# Patient Record
Sex: FEMALE | Race: WHITE | NOT HISPANIC OR LATINO
[De-identification: names, ages, dates, MRNs, and addresses within clinical notes are randomized per-mention and may not be internally consistent; named-entity substitution may affect disease eponyms.]

---

## 2017-03-21 ENCOUNTER — APPOINTMENT (OUTPATIENT)
Dept: HUMAN REPRODUCTION | Facility: CLINIC | Age: 42
End: 2017-03-21

## 2017-06-06 ENCOUNTER — APPOINTMENT (OUTPATIENT)
Dept: HUMAN REPRODUCTION | Facility: CLINIC | Age: 42
End: 2017-06-06

## 2017-06-13 ENCOUNTER — APPOINTMENT (OUTPATIENT)
Dept: HUMAN REPRODUCTION | Facility: CLINIC | Age: 42
End: 2017-06-13

## 2017-07-20 ENCOUNTER — RECORD ABSTRACTING (OUTPATIENT)
Age: 42
End: 2017-07-20

## 2017-07-20 DIAGNOSIS — Z83.3 FAMILY HISTORY OF DIABETES MELLITUS: ICD-10-CM

## 2017-07-20 DIAGNOSIS — Z31.69 ENCOUNTER FOR OTHER GENERAL COUNSELING AND ADVICE ON PROCREATION: ICD-10-CM

## 2017-07-20 DIAGNOSIS — Z82.49 FAMILY HISTORY OF ISCHEMIC HEART DISEASE AND OTHER DISEASES OF THE CIRCULATORY SYSTEM: ICD-10-CM

## 2017-07-20 DIAGNOSIS — Z87.59 PERSONAL HISTORY OF OTHER COMPLICATIONS OF PREGNANCY, CHILDBIRTH AND THE PUERPERIUM: ICD-10-CM

## 2017-07-25 ENCOUNTER — OTHER (OUTPATIENT)
Age: 42
End: 2017-07-25

## 2017-07-25 DIAGNOSIS — N97.9 FEMALE INFERTILITY, UNSPECIFIED: ICD-10-CM

## 2017-08-07 ENCOUNTER — OTHER (OUTPATIENT)
Age: 42
End: 2017-08-07

## 2017-08-07 ENCOUNTER — APPOINTMENT (OUTPATIENT)
Dept: HUMAN REPRODUCTION | Facility: CLINIC | Age: 42
End: 2017-08-07

## 2017-08-07 RX ORDER — ESTRADIOL 2 MG/1
2 TABLET ORAL
Qty: 90 | Refills: 2 | Status: ACTIVE | COMMUNITY
Start: 2017-08-07 | End: 1900-01-01

## 2017-08-10 ENCOUNTER — APPOINTMENT (OUTPATIENT)
Dept: MATERNAL FETAL MEDICINE | Facility: CLINIC | Age: 42
End: 2017-08-10
Payer: COMMERCIAL

## 2017-08-10 ENCOUNTER — ASOB RESULT (OUTPATIENT)
Age: 42
End: 2017-08-10

## 2017-08-10 ENCOUNTER — APPOINTMENT (OUTPATIENT)
Dept: ANTEPARTUM | Facility: CLINIC | Age: 42
End: 2017-08-10

## 2017-08-10 VITALS
BODY MASS INDEX: 31.58 KG/M2 | SYSTOLIC BLOOD PRESSURE: 118 MMHG | WEIGHT: 185 LBS | DIASTOLIC BLOOD PRESSURE: 82 MMHG | HEIGHT: 64 IN

## 2017-08-10 LAB
BILIRUB UR QL STRIP: NORMAL
GLUCOSE UR-MCNC: NORMAL
HCG UR QL: 0.2 EU/DL
HGB UR QL STRIP.AUTO: NORMAL
KETONES UR-MCNC: NORMAL
LEUKOCYTE ESTERASE UR QL STRIP: NORMAL
NITRITE UR QL STRIP: NORMAL
PH UR STRIP: 5
PROT UR STRIP-MCNC: NORMAL
SP GR UR STRIP: 1.02

## 2017-08-10 PROCEDURE — 99243 OFF/OP CNSLTJ NEW/EST LOW 30: CPT | Mod: 25

## 2017-08-11 LAB
ALBUMIN SERPL ELPH-MCNC: 4 G/DL
ALP BLD-CCNC: 65 U/L
ALT SERPL-CCNC: 20 U/L
ANION GAP SERPL CALC-SCNC: 16 MMOL/L
AST SERPL-CCNC: 21 U/L
BASOPHILS # BLD AUTO: 0.03 K/UL
BASOPHILS NFR BLD AUTO: 0.4 %
BILIRUB SERPL-MCNC: <0.2 MG/DL
BUN SERPL-MCNC: 13 MG/DL
CALCIUM SERPL-MCNC: 10.1 MG/DL
CHLORIDE SERPL-SCNC: 103 MMOL/L
CO2 SERPL-SCNC: 22 MMOL/L
CREAT SERPL-MCNC: 0.85 MG/DL
EOSINOPHIL # BLD AUTO: 0.29 K/UL
EOSINOPHIL NFR BLD AUTO: 3.8 %
GLUCOSE SERPL-MCNC: 96 MG/DL
HCT VFR BLD CALC: 41.1 %
HGB BLD-MCNC: 13 G/DL
IMM GRANULOCYTES NFR BLD AUTO: 0.1 %
LYMPHOCYTES # BLD AUTO: 1.91 K/UL
LYMPHOCYTES NFR BLD AUTO: 25.3 %
MAN DIFF?: NORMAL
MCHC RBC-ENTMCNC: 29.1 PG
MCHC RBC-ENTMCNC: 31.6 GM/DL
MCV RBC AUTO: 91.9 FL
MONOCYTES # BLD AUTO: 0.22 K/UL
MONOCYTES NFR BLD AUTO: 2.9 %
NEUTROPHILS # BLD AUTO: 5.1 K/UL
NEUTROPHILS NFR BLD AUTO: 67.5 %
PLATELET # BLD AUTO: 298 K/UL
POTASSIUM SERPL-SCNC: 4.9 MMOL/L
PROT SERPL-MCNC: 7 G/DL
RBC # BLD: 4.47 M/UL
RBC # FLD: 14.1 %
SODIUM SERPL-SCNC: 141 MMOL/L
WBC # FLD AUTO: 7.56 K/UL

## 2017-08-14 ENCOUNTER — APPOINTMENT (OUTPATIENT)
Dept: HUMAN REPRODUCTION | Facility: CLINIC | Age: 42
End: 2017-08-14
Payer: COMMERCIAL

## 2017-08-14 PROCEDURE — 76830 TRANSVAGINAL US NON-OB: CPT

## 2017-08-14 PROCEDURE — 36415 COLL VENOUS BLD VENIPUNCTURE: CPT

## 2017-08-14 PROCEDURE — 99213 OFFICE O/P EST LOW 20 MIN: CPT | Mod: 25

## 2017-08-21 ENCOUNTER — APPOINTMENT (OUTPATIENT)
Dept: HUMAN REPRODUCTION | Facility: CLINIC | Age: 42
End: 2017-08-21
Payer: COMMERCIAL

## 2017-08-21 ENCOUNTER — OTHER (OUTPATIENT)
Age: 42
End: 2017-08-21

## 2017-08-21 PROCEDURE — 99213 OFFICE O/P EST LOW 20 MIN: CPT | Mod: 25

## 2017-08-21 PROCEDURE — 76830 TRANSVAGINAL US NON-OB: CPT

## 2017-08-21 PROCEDURE — 36415 COLL VENOUS BLD VENIPUNCTURE: CPT

## 2017-08-22 ENCOUNTER — OTHER (OUTPATIENT)
Age: 42
End: 2017-08-22

## 2017-08-22 RX ORDER — MEDROXYPROGESTERONE ACETATE 10 MG/1
10 TABLET ORAL
Qty: 7 | Refills: 0 | Status: ACTIVE | COMMUNITY
Start: 2017-08-21 | End: 1900-01-01

## 2017-08-28 ENCOUNTER — OUTPATIENT (OUTPATIENT)
Dept: OUTPATIENT SERVICES | Facility: HOSPITAL | Age: 42
LOS: 1 days | End: 2017-08-28
Payer: COMMERCIAL

## 2017-08-28 DIAGNOSIS — Z31.69 ENCOUNTER FOR OTHER GENERAL COUNSELING AND ADVICE ON PROCREATION: ICD-10-CM

## 2017-08-28 PROCEDURE — 93010 ELECTROCARDIOGRAM REPORT: CPT

## 2017-08-28 PROCEDURE — 93005 ELECTROCARDIOGRAM TRACING: CPT

## 2017-09-06 ENCOUNTER — APPOINTMENT (OUTPATIENT)
Dept: HUMAN REPRODUCTION | Facility: CLINIC | Age: 42
End: 2017-09-06
Payer: COMMERCIAL

## 2017-09-06 PROCEDURE — 58999 UNLISTED PX FML GENITAL SYS: CPT

## 2017-09-06 PROCEDURE — 81025 URINE PREGNANCY TEST: CPT

## 2017-09-06 PROCEDURE — 76831 ECHO EXAM UTERUS: CPT

## 2017-09-06 PROCEDURE — 58340 CATHETER FOR HYSTEROGRAPHY: CPT

## 2017-09-06 PROCEDURE — 99213 OFFICE O/P EST LOW 20 MIN: CPT | Mod: 25

## 2017-09-27 ENCOUNTER — OTHER (OUTPATIENT)
Age: 42
End: 2017-09-27

## 2017-09-29 ENCOUNTER — APPOINTMENT (OUTPATIENT)
Dept: HUMAN REPRODUCTION | Facility: CLINIC | Age: 42
End: 2017-09-29
Payer: COMMERCIAL

## 2017-09-29 PROCEDURE — 76830 TRANSVAGINAL US NON-OB: CPT

## 2017-09-29 PROCEDURE — 36415 COLL VENOUS BLD VENIPUNCTURE: CPT

## 2017-09-29 PROCEDURE — 99213 OFFICE O/P EST LOW 20 MIN: CPT | Mod: 25

## 2017-10-09 ENCOUNTER — APPOINTMENT (OUTPATIENT)
Dept: HUMAN REPRODUCTION | Facility: CLINIC | Age: 42
End: 2017-10-09
Payer: COMMERCIAL

## 2017-10-09 PROCEDURE — 36415 COLL VENOUS BLD VENIPUNCTURE: CPT

## 2017-10-09 PROCEDURE — 76830 TRANSVAGINAL US NON-OB: CPT

## 2017-10-09 PROCEDURE — 99213 OFFICE O/P EST LOW 20 MIN: CPT | Mod: 25

## 2017-10-19 ENCOUNTER — APPOINTMENT (OUTPATIENT)
Dept: HUMAN REPRODUCTION | Facility: CLINIC | Age: 42
End: 2017-10-19

## 2017-10-19 ENCOUNTER — OTHER (OUTPATIENT)
Age: 42
End: 2017-10-19

## 2017-10-20 ENCOUNTER — APPOINTMENT (OUTPATIENT)
Dept: HUMAN REPRODUCTION | Facility: CLINIC | Age: 42
End: 2017-10-20
Payer: COMMERCIAL

## 2017-10-20 PROCEDURE — 76830 TRANSVAGINAL US NON-OB: CPT

## 2017-10-20 PROCEDURE — 36415 COLL VENOUS BLD VENIPUNCTURE: CPT

## 2017-10-20 PROCEDURE — 99213 OFFICE O/P EST LOW 20 MIN: CPT | Mod: 25

## 2017-10-27 ENCOUNTER — APPOINTMENT (OUTPATIENT)
Dept: HUMAN REPRODUCTION | Facility: CLINIC | Age: 42
End: 2017-10-27
Payer: COMMERCIAL

## 2017-10-27 PROCEDURE — 99213 OFFICE O/P EST LOW 20 MIN: CPT | Mod: 25

## 2017-10-27 PROCEDURE — 36415 COLL VENOUS BLD VENIPUNCTURE: CPT

## 2017-10-27 PROCEDURE — 76830 TRANSVAGINAL US NON-OB: CPT

## 2017-11-02 ENCOUNTER — OTHER (OUTPATIENT)
Age: 42
End: 2017-11-02

## 2017-11-02 RX ORDER — SYRINGE WITH NEEDLE, 1 ML 25GX5/8"
22G X 1-1/2" SYRINGE, EMPTY DISPOSABLE MISCELLANEOUS
Qty: 30 | Refills: 1 | Status: ACTIVE | COMMUNITY
Start: 2017-11-02 | End: 1900-01-01

## 2017-11-02 RX ORDER — ISOPROPYL ALCOHOL 0.7 ML/ML
SWAB TOPICAL
Qty: 30 | Refills: 3 | Status: ACTIVE | COMMUNITY
Start: 2017-11-02 | End: 1900-01-01

## 2017-11-02 RX ORDER — NEEDLES, SAFETY 18GX1 1/2"
18G X 1-1/2" NEEDLE, DISPOSABLE MISCELLANEOUS
Qty: 30 | Refills: 1 | Status: ACTIVE | COMMUNITY
Start: 2017-11-02 | End: 1900-01-01

## 2017-11-02 RX ORDER — PROGESTERONE 50 MG/ML
50 INJECTION, SOLUTION INTRAMUSCULAR
Qty: 3 | Refills: 3 | Status: ACTIVE | COMMUNITY
Start: 2017-11-02 | End: 1900-01-01

## 2017-11-03 ENCOUNTER — OTHER (OUTPATIENT)
Age: 42
End: 2017-11-03

## 2017-11-03 RX ORDER — MEDROXYPROGESTERONE ACETATE 10 MG/1
10 TABLET ORAL DAILY
Qty: 7 | Refills: 0 | Status: ACTIVE | COMMUNITY
Start: 2017-11-03 | End: 1900-01-01

## 2018-07-06 ENCOUNTER — OTHER (OUTPATIENT)
Age: 43
End: 2018-07-06

## 2018-07-06 RX ORDER — ESTRADIOL 2 MG/1
2 TABLET ORAL
Qty: 90 | Refills: 2 | Status: ACTIVE | COMMUNITY
Start: 2017-07-25 | End: 1900-01-01

## 2018-07-06 RX ORDER — DESOGESTREL AND ETHINYL ESTRADIOL 0.15-0.03
0.15-3 KIT ORAL
Qty: 28 | Refills: 0 | Status: ACTIVE | COMMUNITY
Start: 2017-09-27 | End: 1900-01-01

## 2018-07-11 ENCOUNTER — APPOINTMENT (OUTPATIENT)
Dept: HUMAN REPRODUCTION | Facility: CLINIC | Age: 43
End: 2018-07-11

## 2018-07-12 ENCOUNTER — APPOINTMENT (OUTPATIENT)
Dept: HUMAN REPRODUCTION | Facility: CLINIC | Age: 43
End: 2018-07-12
Payer: COMMERCIAL

## 2018-07-12 ENCOUNTER — APPOINTMENT (OUTPATIENT)
Dept: HUMAN REPRODUCTION | Facility: CLINIC | Age: 43
End: 2018-07-12

## 2018-07-12 ENCOUNTER — OTHER (OUTPATIENT)
Age: 43
End: 2018-07-12

## 2018-07-12 PROCEDURE — 76830 TRANSVAGINAL US NON-OB: CPT

## 2018-07-12 PROCEDURE — 58999 UNLISTED PX FML GENITAL SYS: CPT

## 2018-07-12 PROCEDURE — 99213 OFFICE O/P EST LOW 20 MIN: CPT | Mod: 25

## 2018-07-12 PROCEDURE — 58340 CATHETER FOR HYSTEROGRAPHY: CPT

## 2018-07-13 ENCOUNTER — OTHER (OUTPATIENT)
Age: 43
End: 2018-07-13

## 2018-07-13 RX ORDER — LEVOTHYROXINE SODIUM 0.03 MG/1
25 TABLET ORAL DAILY
Qty: 30 | Refills: 1 | Status: ACTIVE | COMMUNITY
Start: 2018-07-13 | End: 1900-01-01

## 2018-07-16 ENCOUNTER — APPOINTMENT (OUTPATIENT)
Dept: HUMAN REPRODUCTION | Facility: CLINIC | Age: 43
End: 2018-07-16
Payer: COMMERCIAL

## 2018-07-16 PROCEDURE — 99213 OFFICE O/P EST LOW 20 MIN: CPT | Mod: 25

## 2018-07-16 PROCEDURE — 76830 TRANSVAGINAL US NON-OB: CPT

## 2018-07-16 PROCEDURE — 36415 COLL VENOUS BLD VENIPUNCTURE: CPT

## 2018-07-31 ENCOUNTER — APPOINTMENT (OUTPATIENT)
Dept: HUMAN REPRODUCTION | Facility: CLINIC | Age: 43
End: 2018-07-31
Payer: COMMERCIAL

## 2018-07-31 PROCEDURE — 99213 OFFICE O/P EST LOW 20 MIN: CPT | Mod: 25

## 2018-07-31 PROCEDURE — 76830 TRANSVAGINAL US NON-OB: CPT

## 2018-07-31 PROCEDURE — 36415 COLL VENOUS BLD VENIPUNCTURE: CPT

## 2018-08-02 ENCOUNTER — OTHER (OUTPATIENT)
Age: 43
End: 2018-08-02

## 2018-08-03 RX ORDER — METHYLPREDNISOLONE 8 MG/1
8 TABLET ORAL
Qty: 8 | Refills: 0 | Status: ACTIVE | COMMUNITY
Start: 2017-11-02 | End: 1900-01-01

## 2018-08-03 RX ORDER — DOXYCYCLINE HYCLATE 100 MG/1
100 CAPSULE ORAL TWICE DAILY
Qty: 8 | Refills: 0 | Status: ACTIVE | COMMUNITY
Start: 2017-11-02 | End: 1900-01-01

## 2018-08-06 ENCOUNTER — APPOINTMENT (OUTPATIENT)
Dept: HUMAN REPRODUCTION | Facility: CLINIC | Age: 43
End: 2018-08-06
Payer: COMMERCIAL

## 2018-08-06 PROCEDURE — 76830 TRANSVAGINAL US NON-OB: CPT

## 2018-08-06 PROCEDURE — 99213 OFFICE O/P EST LOW 20 MIN: CPT | Mod: 25

## 2018-08-06 PROCEDURE — 36415 COLL VENOUS BLD VENIPUNCTURE: CPT

## 2018-08-08 ENCOUNTER — OTHER (OUTPATIENT)
Age: 43
End: 2018-08-08

## 2018-08-08 PROCEDURE — DP002: CPT

## 2018-08-08 PROCEDURE — 89250 CULTR OOCYTE/EMBRYO <4 DAYS: CPT

## 2018-08-08 PROCEDURE — 89281 ASSIST OOCYTE FERTILIZATION: CPT

## 2018-08-08 PROCEDURE — DP001: CPT

## 2018-08-08 PROCEDURE — 89254 OOCYTE IDENTIFICATION: CPT

## 2018-08-09 RX ORDER — MEDROXYPROGESTERONE ACETATE 10 MG/1
10 TABLET ORAL
Qty: 5 | Refills: 0 | Status: ACTIVE | COMMUNITY
Start: 2018-08-08 | End: 1900-01-01

## 2018-08-11 PROCEDURE — 89253 EMBRYO HATCHING: CPT

## 2018-08-12 PROCEDURE — 89272 EXTENDED CULTURE OF OOCYTES: CPT

## 2018-08-13 PROCEDURE — 89258 CRYOPRESERVATION EMBRYO(S): CPT

## 2018-08-14 PROCEDURE — 89342 STORAGE/YEAR EMBRYO(S): CPT

## 2018-08-14 PROCEDURE — 89258 CRYOPRESERVATION EMBRYO(S): CPT

## 2018-09-10 ENCOUNTER — APPOINTMENT (OUTPATIENT)
Dept: HUMAN REPRODUCTION | Facility: CLINIC | Age: 43
End: 2018-09-10
Payer: COMMERCIAL

## 2018-09-10 PROCEDURE — 36415 COLL VENOUS BLD VENIPUNCTURE: CPT

## 2018-09-10 PROCEDURE — 99213 OFFICE O/P EST LOW 20 MIN: CPT | Mod: 25

## 2018-09-10 PROCEDURE — 76830 TRANSVAGINAL US NON-OB: CPT

## 2018-09-19 ENCOUNTER — APPOINTMENT (OUTPATIENT)
Dept: HUMAN REPRODUCTION | Facility: CLINIC | Age: 43
End: 2018-09-19
Payer: COMMERCIAL

## 2018-09-19 PROCEDURE — 76830 TRANSVAGINAL US NON-OB: CPT

## 2018-09-19 PROCEDURE — 99213 OFFICE O/P EST LOW 20 MIN: CPT | Mod: 25

## 2018-09-19 PROCEDURE — 36415 COLL VENOUS BLD VENIPUNCTURE: CPT

## 2018-09-21 ENCOUNTER — APPOINTMENT (OUTPATIENT)
Dept: HUMAN REPRODUCTION | Facility: CLINIC | Age: 43
End: 2018-09-21
Payer: COMMERCIAL

## 2018-09-21 PROCEDURE — 36415 COLL VENOUS BLD VENIPUNCTURE: CPT

## 2018-09-21 PROCEDURE — 76830 TRANSVAGINAL US NON-OB: CPT

## 2018-09-21 PROCEDURE — 99213 OFFICE O/P EST LOW 20 MIN: CPT | Mod: 25

## 2018-09-28 ENCOUNTER — APPOINTMENT (OUTPATIENT)
Dept: HUMAN REPRODUCTION | Facility: CLINIC | Age: 43
End: 2018-09-28
Payer: COMMERCIAL

## 2018-09-28 PROCEDURE — 58974 EMBRYO TRANSFER INTRAUTERINE: CPT

## 2018-09-28 PROCEDURE — 89398 UNLISTED REPROD MED LAB PROC: CPT

## 2018-09-28 PROCEDURE — 89352 THAWING CRYOPRESRVED EMBRYO: CPT

## 2018-09-28 PROCEDURE — 89255 PREPARE EMBRYO FOR TRANSFER: CPT

## 2018-09-28 PROCEDURE — 99213 OFFICE O/P EST LOW 20 MIN: CPT | Mod: 25

## 2018-09-28 PROCEDURE — 89253 EMBRYO HATCHING: CPT

## 2018-09-28 PROCEDURE — 76942 ECHO GUIDE FOR BIOPSY: CPT

## 2018-09-28 PROCEDURE — 89250 CULTR OOCYTE/EMBRYO <4 DAYS: CPT

## 2018-10-08 ENCOUNTER — APPOINTMENT (OUTPATIENT)
Dept: HUMAN REPRODUCTION | Facility: CLINIC | Age: 43
End: 2018-10-08
Payer: COMMERCIAL

## 2018-10-08 PROCEDURE — 36415 COLL VENOUS BLD VENIPUNCTURE: CPT

## 2018-10-10 ENCOUNTER — APPOINTMENT (OUTPATIENT)
Dept: HUMAN REPRODUCTION | Facility: CLINIC | Age: 43
End: 2018-10-10
Payer: COMMERCIAL

## 2018-10-10 PROCEDURE — 36415 COLL VENOUS BLD VENIPUNCTURE: CPT

## 2018-10-18 ENCOUNTER — APPOINTMENT (OUTPATIENT)
Dept: HUMAN REPRODUCTION | Facility: CLINIC | Age: 43
End: 2018-10-18
Payer: COMMERCIAL

## 2018-10-18 PROCEDURE — 36415 COLL VENOUS BLD VENIPUNCTURE: CPT

## 2018-10-18 PROCEDURE — 99213 OFFICE O/P EST LOW 20 MIN: CPT | Mod: 25

## 2018-10-18 PROCEDURE — 76817 TRANSVAGINAL US OBSTETRIC: CPT

## 2018-10-25 ENCOUNTER — APPOINTMENT (OUTPATIENT)
Dept: HUMAN REPRODUCTION | Facility: CLINIC | Age: 43
End: 2018-10-25
Payer: COMMERCIAL

## 2018-10-25 PROCEDURE — 36415 COLL VENOUS BLD VENIPUNCTURE: CPT

## 2018-10-25 PROCEDURE — 76817 TRANSVAGINAL US OBSTETRIC: CPT

## 2018-10-25 PROCEDURE — 99213 OFFICE O/P EST LOW 20 MIN: CPT | Mod: 25

## 2018-11-01 ENCOUNTER — APPOINTMENT (OUTPATIENT)
Dept: HUMAN REPRODUCTION | Facility: CLINIC | Age: 43
End: 2018-11-01
Payer: COMMERCIAL

## 2018-11-01 PROCEDURE — 76817 TRANSVAGINAL US OBSTETRIC: CPT

## 2018-11-01 PROCEDURE — 99213 OFFICE O/P EST LOW 20 MIN: CPT | Mod: 25

## 2018-11-30 ENCOUNTER — LABORATORY RESULT (OUTPATIENT)
Age: 43
End: 2018-11-30

## 2018-11-30 ENCOUNTER — APPOINTMENT (OUTPATIENT)
Dept: ANTEPARTUM | Facility: CLINIC | Age: 43
End: 2018-11-30
Payer: COMMERCIAL

## 2018-11-30 ENCOUNTER — ASOB RESULT (OUTPATIENT)
Age: 43
End: 2018-11-30

## 2018-11-30 PROCEDURE — 36415 COLL VENOUS BLD VENIPUNCTURE: CPT

## 2018-11-30 PROCEDURE — 76813 OB US NUCHAL MEAS 1 GEST: CPT

## 2018-11-30 PROCEDURE — 76801 OB US < 14 WKS SINGLE FETUS: CPT

## 2018-12-07 ENCOUNTER — TRANSCRIPTION ENCOUNTER (OUTPATIENT)
Age: 43
End: 2018-12-07

## 2018-12-14 ENCOUNTER — TRANSCRIPTION ENCOUNTER (OUTPATIENT)
Age: 43
End: 2018-12-14

## 2018-12-14 RX ORDER — LEVOTHYROXINE SODIUM 0.03 MG/1
25 TABLET ORAL DAILY
Qty: 30 | Refills: 1 | Status: ACTIVE | COMMUNITY
Start: 2018-07-13 | End: 1900-01-01

## 2018-12-31 ENCOUNTER — ASOB RESULT (OUTPATIENT)
Age: 43
End: 2018-12-31

## 2018-12-31 ENCOUNTER — APPOINTMENT (OUTPATIENT)
Dept: ANTEPARTUM | Facility: CLINIC | Age: 43
End: 2018-12-31
Payer: COMMERCIAL

## 2018-12-31 PROCEDURE — 76805 OB US >/= 14 WKS SNGL FETUS: CPT

## 2018-12-31 PROCEDURE — 76817 TRANSVAGINAL US OBSTETRIC: CPT | Mod: 59

## 2019-01-28 ENCOUNTER — ASOB RESULT (OUTPATIENT)
Age: 44
End: 2019-01-28

## 2019-01-28 ENCOUNTER — APPOINTMENT (OUTPATIENT)
Dept: ANTEPARTUM | Facility: CLINIC | Age: 44
End: 2019-01-28
Payer: COMMERCIAL

## 2019-01-28 PROCEDURE — 76817 TRANSVAGINAL US OBSTETRIC: CPT | Mod: 59

## 2019-01-28 PROCEDURE — 76811 OB US DETAILED SNGL FETUS: CPT

## 2019-01-28 PROCEDURE — 93325 DOPPLER ECHO COLOR FLOW MAPG: CPT

## 2019-01-28 PROCEDURE — 76825 ECHO EXAM OF FETAL HEART: CPT

## 2019-01-28 PROCEDURE — 76827 ECHO EXAM OF FETAL HEART: CPT

## 2019-01-31 ENCOUNTER — APPOINTMENT (OUTPATIENT)
Dept: ANTEPARTUM | Facility: CLINIC | Age: 44
End: 2019-01-31

## 2019-03-28 ENCOUNTER — APPOINTMENT (OUTPATIENT)
Dept: ANTEPARTUM | Facility: CLINIC | Age: 44
End: 2019-03-28
Payer: COMMERCIAL

## 2019-03-28 ENCOUNTER — ASOB RESULT (OUTPATIENT)
Age: 44
End: 2019-03-28

## 2019-03-28 PROCEDURE — 76816 OB US FOLLOW-UP PER FETUS: CPT

## 2019-03-28 PROCEDURE — 76817 TRANSVAGINAL US OBSTETRIC: CPT

## 2019-04-29 ENCOUNTER — ASOB RESULT (OUTPATIENT)
Age: 44
End: 2019-04-29

## 2019-04-29 ENCOUNTER — APPOINTMENT (OUTPATIENT)
Dept: ANTEPARTUM | Facility: CLINIC | Age: 44
End: 2019-04-29
Payer: COMMERCIAL

## 2019-04-29 ENCOUNTER — TRANSCRIPTION ENCOUNTER (OUTPATIENT)
Age: 44
End: 2019-04-29

## 2019-04-29 PROCEDURE — 76816 OB US FOLLOW-UP PER FETUS: CPT

## 2019-04-30 ENCOUNTER — OUTPATIENT (OUTPATIENT)
Dept: INPATIENT UNIT | Facility: HOSPITAL | Age: 44
LOS: 1 days | Discharge: HOME | End: 2019-04-30
Payer: COMMERCIAL

## 2019-04-30 VITALS
DIASTOLIC BLOOD PRESSURE: 68 MMHG | TEMPERATURE: 98 F | SYSTOLIC BLOOD PRESSURE: 111 MMHG | HEART RATE: 96 BPM | RESPIRATION RATE: 18 BRPM

## 2019-04-30 DIAGNOSIS — O26.899 OTHER SPECIFIED PREGNANCY RELATED CONDITIONS, UNSPECIFIED TRIMESTER: ICD-10-CM

## 2019-04-30 DIAGNOSIS — Z3A.00 WEEKS OF GESTATION OF PREGNANCY NOT SPECIFIED: ICD-10-CM

## 2019-04-30 PROCEDURE — 99214 OFFICE O/P EST MOD 30 MIN: CPT

## 2019-04-30 NOTE — OB PROVIDER TRIAGE NOTE - HISTORY OF PRESENT ILLNESS
44y/o  @33.2wks presents from office with low baseline with variable. Triage for prolonged monitoring. Reports good fetal movement. Denies LOF and VB.    Allergies: Denies  Medications: PNV, was on synthroid during first trimester    Medical HX: Asthma  Surgical HX: D&C and D&E  PSY: Denies  Etoh/Drugs: Denies 44y/o  @33.2wks presents from office with low baseline and variables. Triage for prolonged monitoring. Reports good fetal movement. Denies LOF and VB.    Allergies: Denies  Medications: PNV, was on synthroid during first trimester    Medical HX: Asthma  Surgical HX: D&C and D&E  PSY: Denies  Etoh/Drugs: Denies

## 2019-04-30 NOTE — CHART NOTE - NSCHARTNOTEFT_GEN_A_CORE
Received report from BERHANE Guy NP.     NST reactive x 2 hours  no ctx noted    to be discussed with Dr Delgado. Pt discharged home with  precautions. Fetal kick counts reviewed. Encouraged increased PO hydration. F/U next scheduled prenatal appointment.    EMANUEL Gonzales Received report from BERHANE Guy NP.     BPP with [facility] 19 1810 bpp , martha 13.2    NST reactive x 2 hours  no ctx noted    discussed with Dr Delgado. Pt discharged home with  precautions. Fetal kick counts reviewed. Encouraged increased PO hydration. F/U next scheduled prenatal appointment.    EMANUEL Gonzales

## 2019-04-30 NOTE — OB PROVIDER TRIAGE NOTE - NS_OBGYNHISTORY_OBGYN_ALL_OB_FT
GYN: Fibroids, no surgery  OB: SABx3 with D&C and D&E    AP course complicated by infertility, donor egg  LGA 90th%, 5#11 as of 4/29/19  Fetal Echo WNL  GTT WNL

## 2019-05-19 ENCOUNTER — OUTPATIENT (OUTPATIENT)
Dept: INPATIENT UNIT | Facility: HOSPITAL | Age: 44
LOS: 1 days | Discharge: HOME | End: 2019-05-19
Payer: COMMERCIAL

## 2019-05-19 VITALS
SYSTOLIC BLOOD PRESSURE: 130 MMHG | RESPIRATION RATE: 18 BRPM | TEMPERATURE: 98 F | DIASTOLIC BLOOD PRESSURE: 77 MMHG | HEART RATE: 100 BPM

## 2019-05-19 VITALS — DIASTOLIC BLOOD PRESSURE: 79 MMHG | SYSTOLIC BLOOD PRESSURE: 116 MMHG | HEART RATE: 93 BPM

## 2019-05-19 DIAGNOSIS — Z98.890 OTHER SPECIFIED POSTPROCEDURAL STATES: Chronic | ICD-10-CM

## 2019-05-19 DIAGNOSIS — O26.899 OTHER SPECIFIED PREGNANCY RELATED CONDITIONS, UNSPECIFIED TRIMESTER: ICD-10-CM

## 2019-05-19 DIAGNOSIS — Z3A.00 WEEKS OF GESTATION OF PREGNANCY NOT SPECIFIED: ICD-10-CM

## 2019-05-19 LAB
BASOPHILS # BLD AUTO: 0.02 K/UL — SIGNIFICANT CHANGE UP (ref 0–0.2)
BASOPHILS NFR BLD AUTO: 0.2 % — SIGNIFICANT CHANGE UP (ref 0–2)
EOSINOPHIL # BLD AUTO: 0.05 K/UL — SIGNIFICANT CHANGE UP (ref 0–0.5)
EOSINOPHIL NFR BLD AUTO: 0.6 % — SIGNIFICANT CHANGE UP (ref 0–6)
FIBRINOGEN PPP-MCNC: 592.8 MG/DL — HIGH (ref 350–510)
HCT VFR BLD CALC: 37.2 % — SIGNIFICANT CHANGE UP (ref 34.5–45)
HGB BLD-MCNC: 12.1 G/DL — SIGNIFICANT CHANGE UP (ref 11.5–15.5)
IMM GRANULOCYTES NFR BLD AUTO: 0.9 % — SIGNIFICANT CHANGE UP (ref 0–1.5)
LYMPHOCYTES # BLD AUTO: 1.82 K/UL — SIGNIFICANT CHANGE UP (ref 1–3.3)
LYMPHOCYTES # BLD AUTO: 20.8 % — SIGNIFICANT CHANGE UP (ref 13–44)
MCHC RBC-ENTMCNC: 27.7 PG — SIGNIFICANT CHANGE UP (ref 27–34)
MCHC RBC-ENTMCNC: 32.5 % — SIGNIFICANT CHANGE UP (ref 32–36)
MCV RBC AUTO: 85.1 FL — SIGNIFICANT CHANGE UP (ref 80–100)
MONOCYTES # BLD AUTO: 0.49 K/UL — SIGNIFICANT CHANGE UP (ref 0–0.9)
MONOCYTES NFR BLD AUTO: 5.6 % — SIGNIFICANT CHANGE UP (ref 2–14)
NEUTROPHILS # BLD AUTO: 6.3 K/UL — SIGNIFICANT CHANGE UP (ref 1.8–7.4)
NEUTROPHILS NFR BLD AUTO: 71.9 % — SIGNIFICANT CHANGE UP (ref 43–77)
NRBC # FLD: 0 K/UL — SIGNIFICANT CHANGE UP (ref 0–0)
PLATELET # BLD AUTO: 200 K/UL — SIGNIFICANT CHANGE UP (ref 150–400)
PMV BLD: 10.3 FL — SIGNIFICANT CHANGE UP (ref 7–13)
RBC # BLD: 4.37 M/UL — SIGNIFICANT CHANGE UP (ref 3.8–5.2)
RBC # FLD: 14.5 % — SIGNIFICANT CHANGE UP (ref 10.3–14.5)
WBC # BLD: 8.76 K/UL — SIGNIFICANT CHANGE UP (ref 3.8–10.5)
WBC # FLD AUTO: 8.76 K/UL — SIGNIFICANT CHANGE UP (ref 3.8–10.5)

## 2019-05-19 PROCEDURE — 59025 FETAL NON-STRESS TEST: CPT | Mod: 26

## 2019-05-19 NOTE — OB PROVIDER TRIAGE NOTE - ADDITIONAL INSTRUCTIONS
Fetal/maternal wellbeing reassured. Discussed findings with Dr. Carroll. Pt. d/c'd home. Pt. to follow up with Dr. Khalil on Tuesday. Pt. instructed to return to triage with increase abdominal cramping, LOF, VB, or decrease FM. Increase PO hydration encouraged. Daily kick counts reviewed.

## 2019-05-19 NOTE — OB RN TRIAGE NOTE - PSH
History of dilation and curettage  D&E for chromosome deletion  Missed   D and C  x2  Status post right breast lumpectomy  2018

## 2019-05-19 NOTE — OB PROVIDER TRIAGE NOTE - NSOBPROVIDERNOTE_OBGYN_ALL_OB_FT
45 y/o  @ 36 wks gest presents with c/o s/p abdominal trauma , pt tripped on carpet and fell and  hit left side of her abdomen on chair and c/o decrease FM denies any uc's vb or lof denies any n/v/d denies any fever or chills ap care comp by : AMA , pt being followed for LGA and is sched for a primary   2019 otherwise no other ap care comp at this time        abdomen: soft, nt on palp  T(C): 36.8 (19 @ 15:59), Max: 36.8 (19 @ 15:59)  HR: 106 (19 @ 16:36) (100 - 106)  BP: 123/71 (19 @ 16:36) (123/71 - 130/77)  RR: 18 (19 @ 15:59) (18 - 18)  SpO2: --  TAS: BPP: 8/8 vtx posterior MARCOS: 17.68     blood type: A+  CBC with diff  fibrinogen       NKA  med hx: denies  surg hx:   right breast lumpectomy- benign   D&C x's 3  gyn hx:   infertilty 2/2 maternal age  egg donor 2/2 previous pregnancy with chromosome depletion  ob hx:   ETOP x's 1 2/2 chromosome depletion  SAB x's 2 incomplete  meds:  PNV         pt for prolonged monitoring  will continue to monitor 43 y/o  @ 36 wks gest presents with c/o s/p abdominal trauma , pt tripped on carpet and fell and  hit left side of her abdomen on chair and c/o decrease FM denies any uc's vb or lof denies any n/v/d denies any fever or chills ap care comp by : AMA , pt being followed for LGA and is sched for a primary   2019 otherwise no other ap care comp at this time        abdomen: soft, nt on palp  T(C): 36.8 (19 @ 15:59), Max: 36.8 (19 @ 15:59)  HR: 106 (19 @ 16:36) (100 - 106)  BP: 123/71 (19 @ 16:36) (123/71 - 130/77)  RR: 18 (19 @ 15:59) (18 - 18)  SpO2: --  TAS: BPP: 8/8 vtx posterior MARCOS: 17.68     blood type: A+  CBC with diff  fibrinogen       NKA  med hx: denies  surg hx:   right breast lumpectomy- benign   D&C x's 3  gyn hx:   infertilty 2/2 maternal age  egg donor 2/2 previous pregnancy with chromosome depletion  ob hx:   ETOP x's 1 2/2 chromosome depletion  SAB x's 2 incomplete  meds:  PNV         pt for prolonged monitoring  will continue to monitor      CBC Full  -  ( 19 May 2019 16:19 )  WBC Count : 8.76 K/uL  RBC Count : 4.37 M/uL  Hemoglobin : 12.1 g/dL  Hematocrit : 37.2 %  Platelet Count - Automated : 200 K/uL  Mean Cell Volume : 85.1 fL  Mean Cell Hemoglobin : 27.7 pg  Mean Cell Hemoglobin Concentration : 32.5 %  Auto Neutrophil # : 6.30 K/uL  Auto Lymphocyte # : 1.82 K/uL  Auto Monocyte # : 0.49 K/uL  Auto Eosinophil # : 0.05 K/uL  Auto Basophil # : 0.02 K/uL  Auto Neutrophil % : 71.9 %  Auto Lymphocyte % : 20.8 %  Auto Monocyte % : 5.6 %  Auto Eosinophil % : 0.6 %  Auto Basophil % : 0.2 % 45 y/o  @ 36 wks gest presents with c/o s/p abdominal trauma , pt tripped on carpet and fell and  hit left side of her abdomen on chair and c/o decrease FM denies any uc's vb or lof denies any n/v/d denies any fever or chills ap care comp by : AMA , pt being followed for LGA and is sched for a primary   2019 otherwise no other ap care comp at this time        abdomen: soft, nt on palp  T(C): 36.8 (19 @ 15:59), Max: 36.8 (19 @ 15:59)  HR: 106 (19 @ 16:36) (100 - 106)  BP: 123/71 (19 @ 16:36) (123/71 - 130/77)  RR: 18 (19 @ 15:59) (18 - 18)  SpO2: --  TAS: BPP: 8/8 vtx posterior MARCOS: 17.68     blood type: A+  CBC with diff  fibrinogen       NKA  med hx: denies  surg hx:   right breast lumpectomy- benign   D&C x's 3  gyn hx:   infertilty 2/2 maternal age  egg donor 2/2 previous pregnancy with chromosome depletion  ob hx:   ETOP x's 1 2/2 chromosome depletion  SAB x's 2 incomplete  meds:  PNV         pt for prolonged monitoring  will continue to monitor      CBC Full  -  ( 19 May 2019 16:19 )  WBC Count : 8.76 K/uL  RBC Count : 4.37 M/uL  Hemoglobin : 12.1 g/dL  Hematocrit : 37.2 %  Platelet Count - Automated : 200 K/uL  Mean Cell Volume : 85.1 fL  Mean Cell Hemoglobin : 27.7 pg  Mean Cell Hemoglobin Concentration : 32.5 %  Auto Neutrophil # : 6.30 K/uL  Auto Lymphocyte # : 1.82 K/uL  Auto Monocyte # : 0.49 K/uL  Auto Eosinophil # : 0.05 K/uL  Auto Basophil # : 0.02 K/uL  Auto Neutrophil % : 71.9 %  Auto Lymphocyte % : 20.8 %  Auto Monocyte % : 5.6 %  Auto Eosinophil % : 0.6 %  Auto Basophil % : 0.2 %      fibrinogen: 592.8 45 y/o  @ 36 wks gest presents with c/o s/p abdominal trauma , pt tripped on carpet and fell and  hit left side of her abdomen on chair and c/o decrease FM denies any uc's vb or lof denies any n/v/d denies any fever or chills ap care comp by : AMA , pt being followed for LGA and is sched for a primary   2019 otherwise no other ap care comp at this time        abdomen: soft, nt on palp  T(C): 36.8 (19 @ 15:59), Max: 36.8 (19 @ 15:59)  HR: 106 (19 @ 16:36) (100 - 106)  BP: 123/71 (19 @ 16:36) (123/71 - 130/77)  RR: 18 (19 @ 15:59) (18 - 18)  SpO2: --  TAS: BPP: 8/8 vtx posterior MARCOS: 17.68     blood type: A+  CBC with diff  fibrinogen       NKA  med hx: denies  surg hx:   right breast lumpectomy- benign   D&C x's 3  gyn hx:   infertilty 2/2 maternal age  egg donor 2/2 previous pregnancy with chromosome depletion  ob hx:   ETOP x's 1 2/2 chromosome depletion  SAB x's 2 incomplete  meds:  PNV         pt for prolonged monitoring  will continue to monitor      CBC Full  -  ( 19 May 2019 16:19 )  WBC Count : 8.76 K/uL  RBC Count : 4.37 M/uL  Hemoglobin : 12.1 g/dL  Hematocrit : 37.2 %  Platelet Count - Automated : 200 K/uL  Mean Cell Volume : 85.1 fL  Mean Cell Hemoglobin : 27.7 pg  Mean Cell Hemoglobin Concentration : 32.5 %  Auto Neutrophil # : 6.30 K/uL  Auto Lymphocyte # : 1.82 K/uL  Auto Monocyte # : 0.49 K/uL  Auto Eosinophil # : 0.05 K/uL  Auto Basophil # : 0.02 K/uL  Auto Neutrophil % : 71.9 %  Auto Lymphocyte % : 20.8 %  Auto Monocyte % : 5.6 %  Auto Eosinophil % : 0.6 %  Auto Basophil % : 0.2 %      fibrinogen: 592.8    Received report from UBALDO MARTINEZ  FHR: 120bpm, moderate variability, accels noted, no decels  No contractions on TOCO    Fetal/maternal wellbeing reassured. Discussed findings with Dr. Carroll. Pt. d/c'd home. Pt. to follow up with Dr. Khalil on Tuesday. Pt. instructed to return to triage with increase abdominal cramping, LOF, VB, or decrease FM. Increase PO hydration encouraged. Daily kick counts reviewed.

## 2019-05-30 ENCOUNTER — OUTPATIENT (OUTPATIENT)
Dept: OUTPATIENT SERVICES | Facility: HOSPITAL | Age: 44
LOS: 1 days | End: 2019-05-30

## 2019-05-30 VITALS
WEIGHT: 205.91 LBS | OXYGEN SATURATION: 98 % | HEART RATE: 95 BPM | TEMPERATURE: 98 F | SYSTOLIC BLOOD PRESSURE: 110 MMHG | HEIGHT: 63.75 IN | DIASTOLIC BLOOD PRESSURE: 80 MMHG | RESPIRATION RATE: 16 BRPM

## 2019-05-30 DIAGNOSIS — O36.63X0 MATERNAL CARE FOR EXCESSIVE FETAL GROWTH, THIRD TRIMESTER, NOT APPLICABLE OR UNSPECIFIED: ICD-10-CM

## 2019-05-30 DIAGNOSIS — Z98.890 OTHER SPECIFIED POSTPROCEDURAL STATES: Chronic | ICD-10-CM

## 2019-05-30 DIAGNOSIS — O36.60X0 MATERNAL CARE FOR EXCESSIVE FETAL GROWTH, UNSPECIFIED TRIMESTER, NOT APPLICABLE OR UNSPECIFIED: ICD-10-CM

## 2019-05-30 LAB
APPEARANCE UR: CLEAR — SIGNIFICANT CHANGE UP
BACTERIA # UR AUTO: SIGNIFICANT CHANGE UP
BILIRUB UR-MCNC: NEGATIVE — SIGNIFICANT CHANGE UP
BLD GP AB SCN SERPL QL: NEGATIVE — SIGNIFICANT CHANGE UP
BLOOD UR QL VISUAL: NEGATIVE — SIGNIFICANT CHANGE UP
COLOR SPEC: YELLOW — SIGNIFICANT CHANGE UP
EPI CELLS # UR: SIGNIFICANT CHANGE UP
GLUCOSE UR-MCNC: NEGATIVE — SIGNIFICANT CHANGE UP
HCT VFR BLD CALC: 39.4 % — SIGNIFICANT CHANGE UP (ref 34.5–45)
HGB BLD-MCNC: 12.6 G/DL — SIGNIFICANT CHANGE UP (ref 11.5–15.5)
KETONES UR-MCNC: SIGNIFICANT CHANGE UP
LEUKOCYTE ESTERASE UR-ACNC: SIGNIFICANT CHANGE UP
MCHC RBC-ENTMCNC: 27.6 PG — SIGNIFICANT CHANGE UP (ref 27–34)
MCHC RBC-ENTMCNC: 32 % — SIGNIFICANT CHANGE UP (ref 32–36)
MCV RBC AUTO: 86.4 FL — SIGNIFICANT CHANGE UP (ref 80–100)
MUCOUS THREADS # UR AUTO: SIGNIFICANT CHANGE UP
NITRITE UR-MCNC: NEGATIVE — SIGNIFICANT CHANGE UP
NRBC # FLD: 0 K/UL — SIGNIFICANT CHANGE UP (ref 0–0)
PH UR: 6.5 — SIGNIFICANT CHANGE UP (ref 5–8)
PLATELET # BLD AUTO: 229 K/UL — SIGNIFICANT CHANGE UP (ref 150–400)
PMV BLD: 11 FL — SIGNIFICANT CHANGE UP (ref 7–13)
PROT UR-MCNC: 30 — SIGNIFICANT CHANGE UP
RBC # BLD: 4.56 M/UL — SIGNIFICANT CHANGE UP (ref 3.8–5.2)
RBC # FLD: 14.9 % — HIGH (ref 10.3–14.5)
RBC CASTS # UR COMP ASSIST: SIGNIFICANT CHANGE UP (ref 0–?)
RH IG SCN BLD-IMP: POSITIVE — SIGNIFICANT CHANGE UP
SP GR SPEC: 1.03 — SIGNIFICANT CHANGE UP (ref 1–1.04)
UROBILINOGEN FLD QL: NORMAL — SIGNIFICANT CHANGE UP
WBC # BLD: 9 K/UL — SIGNIFICANT CHANGE UP (ref 3.8–10.5)
WBC # FLD AUTO: 9 K/UL — SIGNIFICANT CHANGE UP (ref 3.8–10.5)
WBC UR QL: SIGNIFICANT CHANGE UP (ref 0–?)

## 2019-05-30 NOTE — OB PST NOTE - HISTORY OF PRESENT ILLNESS
45 yo pregnant female presents to pre surgical testing.  Pt reports she is , MINAL 19.  Pt denies vaginal bleeding, spotting or leakage of amniotic fluid.  Pt denies regular uterine contractions.  Pt reports positive fetal movement today.  Pt denies receiving rhogam this pregnancy.  Pt is scheduled for primary  section-LGA on 19.

## 2019-05-30 NOTE — OB PST NOTE - PROBLEM SELECTOR PLAN 1
Pt is scheduled for primary  section-LGA on 19.  Pre op instructions including chlorhexidine wash given and pt able to verbalize understanding.   CBC UA RPR T&S pending. Pt is scheduled for primary  section-LGA on 19.  Pre op instructions including chlorhexidine wash given and pt able to verbalize understanding and teach back.   CBC UA RPR T&S pending.

## 2019-05-30 NOTE — OB PST NOTE - NSHPREVIEWOFSYSTEMS_GEN_ALL_CORE

## 2019-05-30 NOTE — OB PST NOTE - NSHPPHYSICALEXAM_GEN_ALL_CORE
Constitutional: Well Developed, Well Groomed, Well Nourished, No Distress    Eyes: PERRL, EOMI, conjunctiva clear    Mouth & Gums: Normal, moist    Pharynx: No tenderness, discharge, or peritonsillar abscess    Neck: Supple, no JVD    Breast: Normal shape    Back: Normal shape, ROM intact, strength intact, no vertebral tenderness    Respiratory: Airway patent, breath sounds equal, good air movement, respiration non-labored, clear to auscultation bilateral, no chest wall tenderness, no intercostal retractions, no rales, no wheezes, no rhonchi, no subcutaneous emphysema    Cardiovascular:  Regular rate and rhythm    Gastrointestinal: Gravid abdomen.  Soft, non-tender, non distention,  bowel sound normal    Extremities: No clubbing, cyanosis, or pedal edema    Vascular:  Radial Pulse normal, DP pulse normal    Neurological: alert & oriented x 3, normal strength    Skin: warm and dry, normal color    Lymph Nodes: normal posterior cervical lymph node, normal anterior cervical lymph node, normal supraclavicular lymph node    Musculoskeletal: ROM intact, no joint swelling, warmth, or calf tenderness. Normal strength    Psychiatric: normal affect, normal behavior

## 2019-05-31 LAB — T PALLIDUM AB TITR SER: NEGATIVE — SIGNIFICANT CHANGE UP

## 2019-06-09 ENCOUNTER — INPATIENT (INPATIENT)
Facility: HOSPITAL | Age: 44
LOS: 4 days | Discharge: HOME | End: 2019-06-14
Attending: OBSTETRICS & GYNECOLOGY | Admitting: OBSTETRICS & GYNECOLOGY

## 2019-06-09 VITALS
DIASTOLIC BLOOD PRESSURE: 87 MMHG | SYSTOLIC BLOOD PRESSURE: 128 MMHG | RESPIRATION RATE: 18 BRPM | TEMPERATURE: 99 F | HEART RATE: 118 BPM

## 2019-06-09 DIAGNOSIS — O36.8390 MATERNAL CARE FOR ABNORMALITIES OF THE FETAL HEART RATE OR RHYTHM, UNSPECIFIED TRIMESTER, NOT APPLICABLE OR UNSPECIFIED: ICD-10-CM

## 2019-06-09 DIAGNOSIS — Z3A.00 WEEKS OF GESTATION OF PREGNANCY NOT SPECIFIED: ICD-10-CM

## 2019-06-09 DIAGNOSIS — O26.899 OTHER SPECIFIED PREGNANCY RELATED CONDITIONS, UNSPECIFIED TRIMESTER: ICD-10-CM

## 2019-06-09 DIAGNOSIS — O09.519 SUPERVISION OF ELDERLY PRIMIGRAVIDA, UNSPECIFIED TRIMESTER: ICD-10-CM

## 2019-06-09 DIAGNOSIS — Z98.890 OTHER SPECIFIED POSTPROCEDURAL STATES: Chronic | ICD-10-CM

## 2019-06-09 PROBLEM — O03.9 COMPLETE OR UNSPECIFIED SPONTANEOUS ABORTION WITHOUT COMPLICATION: Chronic | Status: ACTIVE | Noted: 2019-04-30

## 2019-06-09 LAB
ALBUMIN SERPL ELPH-MCNC: 3.4 G/DL — SIGNIFICANT CHANGE UP (ref 3.3–5)
ALP SERPL-CCNC: 132 U/L — HIGH (ref 40–120)
ALT FLD-CCNC: 15 U/L — SIGNIFICANT CHANGE UP (ref 4–33)
ANION GAP SERPL CALC-SCNC: 16 MMO/L — HIGH (ref 7–14)
APPEARANCE UR: CLEAR — SIGNIFICANT CHANGE UP
APTT BLD: 26 SEC — LOW (ref 27.5–36.3)
AST SERPL-CCNC: 23 U/L — SIGNIFICANT CHANGE UP (ref 4–32)
BACTERIA # UR AUTO: NEGATIVE — SIGNIFICANT CHANGE UP
BASOPHILS # BLD AUTO: 0.02 K/UL — SIGNIFICANT CHANGE UP (ref 0–0.2)
BASOPHILS # BLD AUTO: 0.02 K/UL — SIGNIFICANT CHANGE UP (ref 0–0.2)
BASOPHILS NFR BLD AUTO: 0.2 % — SIGNIFICANT CHANGE UP (ref 0–2)
BASOPHILS NFR BLD AUTO: 0.2 % — SIGNIFICANT CHANGE UP (ref 0–2)
BILIRUB SERPL-MCNC: 0.2 MG/DL — SIGNIFICANT CHANGE UP (ref 0.2–1.2)
BILIRUB UR-MCNC: NEGATIVE — SIGNIFICANT CHANGE UP
BLD GP AB SCN SERPL QL: NEGATIVE — SIGNIFICANT CHANGE UP
BLOOD UR QL VISUAL: NEGATIVE — SIGNIFICANT CHANGE UP
BUN SERPL-MCNC: 11 MG/DL — SIGNIFICANT CHANGE UP (ref 7–23)
CALCIUM SERPL-MCNC: 9.9 MG/DL — SIGNIFICANT CHANGE UP (ref 8.4–10.5)
CHLORIDE SERPL-SCNC: 102 MMOL/L — SIGNIFICANT CHANGE UP (ref 98–107)
CO2 SERPL-SCNC: 20 MMOL/L — LOW (ref 22–31)
COLOR SPEC: YELLOW — SIGNIFICANT CHANGE UP
CREAT ?TM UR-MCNC: 214.3 MG/DL — SIGNIFICANT CHANGE UP
CREAT SERPL-MCNC: 0.66 MG/DL — SIGNIFICANT CHANGE UP (ref 0.5–1.3)
EOSINOPHIL # BLD AUTO: 0.02 K/UL — SIGNIFICANT CHANGE UP (ref 0–0.5)
EOSINOPHIL # BLD AUTO: 0.03 K/UL — SIGNIFICANT CHANGE UP (ref 0–0.5)
EOSINOPHIL NFR BLD AUTO: 0.2 % — SIGNIFICANT CHANGE UP (ref 0–6)
EOSINOPHIL NFR BLD AUTO: 0.3 % — SIGNIFICANT CHANGE UP (ref 0–6)
FIBRINOGEN PPP-MCNC: 582 MG/DL — HIGH (ref 350–510)
GLUCOSE SERPL-MCNC: 105 MG/DL — HIGH (ref 70–99)
GLUCOSE UR-MCNC: NEGATIVE — SIGNIFICANT CHANGE UP
HCT VFR BLD CALC: 38.7 % — SIGNIFICANT CHANGE UP (ref 34.5–45)
HCT VFR BLD CALC: 40.3 % — SIGNIFICANT CHANGE UP (ref 34.5–45)
HGB BLD-MCNC: 12.6 G/DL — SIGNIFICANT CHANGE UP (ref 11.5–15.5)
HGB BLD-MCNC: 13.2 G/DL — SIGNIFICANT CHANGE UP (ref 11.5–15.5)
HYALINE CASTS # UR AUTO: HIGH
IMM GRANULOCYTES NFR BLD AUTO: 0.6 % — SIGNIFICANT CHANGE UP (ref 0–1.5)
IMM GRANULOCYTES NFR BLD AUTO: 0.7 % — SIGNIFICANT CHANGE UP (ref 0–1.5)
INR BLD: 0.91 — SIGNIFICANT CHANGE UP (ref 0.88–1.17)
KETONES UR-MCNC: SIGNIFICANT CHANGE UP
LDH SERPL L TO P-CCNC: 182 U/L — SIGNIFICANT CHANGE UP (ref 135–225)
LEUKOCYTE ESTERASE UR-ACNC: SIGNIFICANT CHANGE UP
LYMPHOCYTES # BLD AUTO: 1.46 K/UL — SIGNIFICANT CHANGE UP (ref 1–3.3)
LYMPHOCYTES # BLD AUTO: 1.83 K/UL — SIGNIFICANT CHANGE UP (ref 1–3.3)
LYMPHOCYTES # BLD AUTO: 14.8 % — SIGNIFICANT CHANGE UP (ref 13–44)
LYMPHOCYTES # BLD AUTO: 18.4 % — SIGNIFICANT CHANGE UP (ref 13–44)
MCHC RBC-ENTMCNC: 28.1 PG — SIGNIFICANT CHANGE UP (ref 27–34)
MCHC RBC-ENTMCNC: 28.1 PG — SIGNIFICANT CHANGE UP (ref 27–34)
MCHC RBC-ENTMCNC: 32.6 % — SIGNIFICANT CHANGE UP (ref 32–36)
MCHC RBC-ENTMCNC: 32.8 % — SIGNIFICANT CHANGE UP (ref 32–36)
MCV RBC AUTO: 85.9 FL — SIGNIFICANT CHANGE UP (ref 80–100)
MCV RBC AUTO: 86.2 FL — SIGNIFICANT CHANGE UP (ref 80–100)
MONOCYTES # BLD AUTO: 0.43 K/UL — SIGNIFICANT CHANGE UP (ref 0–0.9)
MONOCYTES # BLD AUTO: 0.5 K/UL — SIGNIFICANT CHANGE UP (ref 0–0.9)
MONOCYTES NFR BLD AUTO: 4.4 % — SIGNIFICANT CHANGE UP (ref 2–14)
MONOCYTES NFR BLD AUTO: 5 % — SIGNIFICANT CHANGE UP (ref 2–14)
NEUTROPHILS # BLD AUTO: 7.49 K/UL — HIGH (ref 1.8–7.4)
NEUTROPHILS # BLD AUTO: 7.87 K/UL — HIGH (ref 1.8–7.4)
NEUTROPHILS NFR BLD AUTO: 75.6 % — SIGNIFICANT CHANGE UP (ref 43–77)
NEUTROPHILS NFR BLD AUTO: 79.6 % — HIGH (ref 43–77)
NITRITE UR-MCNC: NEGATIVE — SIGNIFICANT CHANGE UP
NRBC # FLD: 0 K/UL — SIGNIFICANT CHANGE UP (ref 0–0)
NRBC # FLD: 0 K/UL — SIGNIFICANT CHANGE UP (ref 0–0)
PH UR: 6 — SIGNIFICANT CHANGE UP (ref 5–8)
PLATELET # BLD AUTO: 182 K/UL — SIGNIFICANT CHANGE UP (ref 150–400)
PLATELET # BLD AUTO: 205 K/UL — SIGNIFICANT CHANGE UP (ref 150–400)
PMV BLD: 10.4 FL — SIGNIFICANT CHANGE UP (ref 7–13)
PMV BLD: 10.9 FL — SIGNIFICANT CHANGE UP (ref 7–13)
POTASSIUM SERPL-MCNC: 4.2 MMOL/L — SIGNIFICANT CHANGE UP (ref 3.5–5.3)
POTASSIUM SERPL-SCNC: 4.2 MMOL/L — SIGNIFICANT CHANGE UP (ref 3.5–5.3)
PROT SERPL-MCNC: 6.4 G/DL — SIGNIFICANT CHANGE UP (ref 6–8.3)
PROT UR-MCNC: 31.9 MG/DL — SIGNIFICANT CHANGE UP
PROT UR-MCNC: 50 — SIGNIFICANT CHANGE UP
PROTHROM AB SERPL-ACNC: 10.4 SEC — SIGNIFICANT CHANGE UP (ref 9.8–13.1)
RBC # BLD: 4.49 M/UL — SIGNIFICANT CHANGE UP (ref 3.8–5.2)
RBC # BLD: 4.69 M/UL — SIGNIFICANT CHANGE UP (ref 3.8–5.2)
RBC # FLD: 15.2 % — HIGH (ref 10.3–14.5)
RBC # FLD: 15.3 % — HIGH (ref 10.3–14.5)
RBC CASTS # UR COMP ASSIST: SIGNIFICANT CHANGE UP (ref 0–?)
RH IG SCN BLD-IMP: POSITIVE — SIGNIFICANT CHANGE UP
SODIUM SERPL-SCNC: 138 MMOL/L — SIGNIFICANT CHANGE UP (ref 135–145)
SP GR SPEC: 1.03 — SIGNIFICANT CHANGE UP (ref 1–1.04)
SQUAMOUS # UR AUTO: SIGNIFICANT CHANGE UP
URATE SERPL-MCNC: 5.6 MG/DL — SIGNIFICANT CHANGE UP (ref 2.5–7)
UROBILINOGEN FLD QL: SIGNIFICANT CHANGE UP
WBC # BLD: 9.88 K/UL — SIGNIFICANT CHANGE UP (ref 3.8–10.5)
WBC # BLD: 9.92 K/UL — SIGNIFICANT CHANGE UP (ref 3.8–10.5)
WBC # FLD AUTO: 9.88 K/UL — SIGNIFICANT CHANGE UP (ref 3.8–10.5)
WBC # FLD AUTO: 9.92 K/UL — SIGNIFICANT CHANGE UP (ref 3.8–10.5)
WBC UR QL: HIGH (ref 0–?)

## 2019-06-09 RX ORDER — OXYTOCIN 10 UNIT/ML
333.33 VIAL (ML) INJECTION
Qty: 20 | Refills: 0 | Status: DISCONTINUED | OUTPATIENT
Start: 2019-06-09 | End: 2019-06-11

## 2019-06-09 RX ORDER — ACETAMINOPHEN 500 MG
975 TABLET ORAL ONCE
Refills: 0 | Status: COMPLETED | OUTPATIENT
Start: 2019-06-09 | End: 2019-06-09

## 2019-06-09 RX ORDER — SODIUM CHLORIDE 9 MG/ML
1000 INJECTION, SOLUTION INTRAVENOUS
Refills: 0 | Status: DISCONTINUED | OUTPATIENT
Start: 2019-06-09 | End: 2019-06-11

## 2019-06-09 RX ORDER — CHLORHEXIDINE GLUCONATE 213 G/1000ML
1 SOLUTION TOPICAL
Refills: 0 | Status: DISCONTINUED | OUTPATIENT
Start: 2019-06-09 | End: 2019-06-11

## 2019-06-09 RX ORDER — CALCIUM CARBONATE 500(1250)
1 TABLET ORAL
Qty: 0 | Refills: 0 | DISCHARGE

## 2019-06-09 RX ORDER — SODIUM CHLORIDE 9 MG/ML
500 INJECTION, SOLUTION INTRAVENOUS ONCE
Refills: 0 | Status: COMPLETED | OUTPATIENT
Start: 2019-06-09 | End: 2019-06-09

## 2019-06-09 RX ADMIN — Medication 975 MILLIGRAM(S): at 16:29

## 2019-06-09 RX ADMIN — SODIUM CHLORIDE 125 MILLILITER(S): 9 INJECTION, SOLUTION INTRAVENOUS at 18:04

## 2019-06-09 RX ADMIN — Medication 975 MILLIGRAM(S): at 17:36

## 2019-06-09 RX ADMIN — SODIUM CHLORIDE 1000 MILLILITER(S): 9 INJECTION, SOLUTION INTRAVENOUS at 17:33

## 2019-06-09 NOTE — OB PROVIDER TRIAGE NOTE - HISTORY OF PRESENT ILLNESS
42yo  @ 39.0 presents with c/o headache since 1300. Reports pain 7/10 and similar to her typical migraines. Also reports seeing spots which is also typical of her migraines. Denies nausea/vomiting, epigastric pain.   Also reports mild ctx every 5 minutes. Denies LOF and reports bloody show and GFM.     H/O 2018 Right Lumpectomy, benign    OB H/O SAB x 2  ETOP x 1    Ap course complicated by:  Low lying placenta-resolved  Patient scheduled for Primary Elective C/S on . Unsure if she wants to labor or have C/S.  GBS negative 5/10  EFW this week 8-0 44yo  @ 39.0 presents with c/o headache since 1300. Reports pain 7/10 and similar to her typical migraines. Also reports seeing spots which is also typical of her migraines. Denies nausea/vomiting, epigastric pain.   Also reports mild ctx every 5 minutes. Denies LOF and reports bloody show and GFM.   NPO since 1400.     H/O 2018 Right Lumpectomy, benign    OB H/O SAB x 2  ETOP x 1    Ap course complicated by:  Donor egg as per [facility] record.  Low lying placenta-resolved  Patient scheduled for Primary Elective C/S on . Unsure if she wants to labor or have C/S.  GBS negative /10  EFW this week 8-0

## 2019-06-09 NOTE — OB RN TRIAGE NOTE - PMH
Asthma  no inhaler use for at least 2 years  Hypothyroidism  first trimester was on synthroid then d/c  Spontaneous   x2

## 2019-06-09 NOTE — OB PROVIDER H&P - ASSESSMENT
43 year old female EDC 19 at 39 wks who initially presented with Headaches which resolved in triage with Tylenol     stated was    for Elective CS  when suspected LGA at 33w but last scan  EFW 7.14# and was counselled for IOL  and was to decide with MD on 6/10    denied any rom lof vb feels gfm     denied any other ap issues denied any fetal issues      1720: Prolonged deceleration noted with spontaneous return to baseline.  Intrauterine resusiatation  LR 500cc bolus  BPP 8, MARCOS 14.2, vtx.  Reports relief of headache a this time.  Will continue to monitor nst x2 h .    Labs WNL    D/W Dr. Tenorio, recommendation for delivery due to AMA > 39yo at 39 weeks gestation.     p Patient unsure if she wants Primary  Vs. IOL. Desires to discuss with  at this time.   p   pt decided will proceed with IOL   case d/w Dr belle.  pt admitted for IOL  at 39 wks for AMA and  CAT 2 tracing in triage

## 2019-06-09 NOTE — OB PROVIDER H&P - HISTORY OF PRESENT ILLNESS
44yo  @ 39.0 presents with c/o headache since 1300. Reports pain 7/10 and similar to her typical migraines. Also reports seeing spots which is also typical of her migraines. Denies nausea/vomiting, epigastric pain.   Also reports mild ctx every 5 minutes. Denies LOF and reports bloody show and GFM.   NPO since 1400.     H/O 2018 Right Lumpectomy, benign    OB H/O SAB x 2  ETOP x 1    Ap course complicated by:  Donor egg as per [facility] record.  Low lying placenta-resolved  Patient scheduled for Primary Elective C/S on . Unsure if she wants to labor or have C/S.  GBS negative /10  EFW this week 8-0

## 2019-06-09 NOTE — OB RN TRIAGE NOTE - ABORTIONS, OB PROFILE
Unilateral primary osteoarthritis, left knee Unilateral primary osteoarthritis, left knee Unilateral primary osteoarthritis, left knee CHRISTOPHE (obstructive sleep apnea) Unilateral primary osteoarthritis, left knee 3 Acute post-operative pain Acute post-operative pain Acute post-operative pain

## 2019-06-09 NOTE — OB PROVIDER IHI INDUCTION/AUGMENTATION NOTE - NS_CHECKALL_OBGYN_ALL_OB
H&P was completed/Induction / Augmentation was discussed/Order was written/FHR was reviewed/Contractions pattern was reviewed

## 2019-06-09 NOTE — OB RN PATIENT PROFILE - ALERT: PERTINENT HISTORY
Follow up Sonogram for Growth/Fetal Non-Stress Test (NST) Follow up Sonogram for Growth/20 Week Level II Sonogram/1st Trimester Sonogram/Fetal Non-Stress Test (NST)

## 2019-06-09 NOTE — OB PROVIDER TRIAGE NOTE - NSHPPHYSICALEXAM_GEN_ALL_CORE
Assessment reveals VSS, abdomen soft, NT, gravid. Normotensive: 128/87, 116/75, 118/77, 108/60, 113/63  Tylenol 975mg PO given x 1  VE 1/60/-3  Cat 1 FHT, ctx 2-3 on toco, patient unaware.   HELLP labs.    1720: Prolonged deceleration noted with spontaneous return to baseline.  Intrauterine resusiatation  LR 500cc bolus  BPP 8/8, MARCOS 14.2, vtx.  Reports relief of headache a this time.  Will continue to monitor. Assessment reveals VSS, abdomen soft, NT, gravid. Normotensive: 128/87, 116/75, 118/77, 108/60, 113/63  Tylenol 975mg PO given x 1  VE 1/60/-3  Cat 1 FHT, ctx 2-3 on toco, patient unaware.   HELLP labs.    1720: Prolonged deceleration noted with spontaneous return to baseline.  Intrauterine resusiatation  LR 500cc bolus  BPP 8/8, MARCOS 14.2, vtx.  Reports relief of headache a this time.  Will continue to monitor.    Labs WNL Assessment reveals VSS, abdomen soft, NT, gravid. Normotensive: 128/87, 116/75, 118/77, 108/60, 113/63  Tylenol 975mg PO given x 1  VE 60/-3  Cat 1 FHT, ctx 2-3 on toco, patient unaware.   HELLP labs.    1720: Prolonged deceleration noted with spontaneous return to baseline.  Intrauterine resusiatation  LR 500cc bolus  BPP 8/8, MARCOS 14.2, vtx.  Reports relief of headache a this time.  Will continue to monitor.    Labs WNL    D/W Dr. Tenorio, recommendation for delivery due to AMA > 39yo at 39 weeks gestation.    Patient unsure if she wants Primary  Vs. IOL. Desires to discuss with  at this time. Assessment reveals VSS, abdomen soft, NT, gravid. Normotensive: 128/87, 116/75, 118/77, 108/60, 113/63  Tylenol 975mg PO given x 1  VE 60/-3  Cat 1 FHT, ctx 2-3 on toco, patient unaware.   HELLP labs.    1720: Prolonged deceleration noted with spontaneous return to baseline.  Intrauterine resusiatation  LR 500cc bolus  BPP 8/8, MARCOS 14.2, vtx.  Reports relief of headache a this time.  Will continue to monitor.    Labs WNL    D/W Dr. Tenorio, recommendation for delivery due to AMA > 41yo at 39 weeks gestation.    Patient unsure if she wants Primary  Vs. IOL. Desires to discuss with  at this time.   1930p   pt decided will proceed with IOL   case d/w Dr belle.

## 2019-06-09 NOTE — OB PROVIDER H&P - NSHPPHYSICALEXAM_GEN_ALL_CORE
Assessment reveals VSS, abdomen soft, NT, gravid. Normotensive: 128/87, 116/75, 118/77, 108/60, 113/63  Tylenol 975mg PO given x 1  VE 60/-3  Cat 1 FHT, ctx 2-3 on toco, patient unaware.   HELLP labs.    1720: Prolonged deceleration noted with spontaneous return to baseline.  Intrauterine resusiatation  LR 500cc bolus  BPP 8/8, MARCOS 14.2, vtx.  Reports relief of headache a this time.  Will continue to monitor.    Labs WNL    D/W Dr. Tenorio, recommendation for delivery due to AMA > 41yo at 39 weeks gestation.    Patient unsure if she wants Primary  Vs. IOL. Desires to discuss with  at this time.   1930p   pt decided will proceed with IOL   case d/w Dr belle.  pt admitted for IOL  at 39 wks for AMA and  CAT 2 tracing in triage

## 2019-06-10 ENCOUNTER — TRANSCRIPTION ENCOUNTER (OUTPATIENT)
Age: 44
End: 2019-06-10

## 2019-06-10 DIAGNOSIS — O36.8390 MATERNAL CARE FOR ABNORMALITIES OF THE FETAL HEART RATE OR RHYTHM, UNSPECIFIED TRIMESTER, NOT APPLICABLE OR UNSPECIFIED: ICD-10-CM

## 2019-06-10 LAB — T PALLIDUM AB TITR SER: NEGATIVE — SIGNIFICANT CHANGE UP

## 2019-06-10 RX ORDER — MORPHINE SULFATE 50 MG/1
4 CAPSULE, EXTENDED RELEASE ORAL ONCE
Refills: 0 | Status: DISCONTINUED | OUTPATIENT
Start: 2019-06-10 | End: 2019-06-10

## 2019-06-10 RX ORDER — SODIUM CHLORIDE 9 MG/ML
1000 INJECTION, SOLUTION INTRAVENOUS ONCE
Refills: 0 | Status: COMPLETED | OUTPATIENT
Start: 2019-06-10 | End: 2019-06-10

## 2019-06-10 RX ORDER — OXYTOCIN 10 UNIT/ML
2 VIAL (ML) INJECTION
Qty: 30 | Refills: 0 | Status: DISCONTINUED | OUTPATIENT
Start: 2019-06-10 | End: 2019-06-11

## 2019-06-10 RX ORDER — SODIUM CHLORIDE 0.65 %
1 AEROSOL, SPRAY (ML) NASAL
Refills: 0 | Status: DISCONTINUED | OUTPATIENT
Start: 2019-06-10 | End: 2019-06-11

## 2019-06-10 RX ADMIN — MORPHINE SULFATE 4 MILLIGRAM(S): 50 CAPSULE, EXTENDED RELEASE ORAL at 05:37

## 2019-06-10 RX ADMIN — MORPHINE SULFATE 4 MILLIGRAM(S): 50 CAPSULE, EXTENDED RELEASE ORAL at 05:57

## 2019-06-10 RX ADMIN — MORPHINE SULFATE 4 MILLIGRAM(S): 50 CAPSULE, EXTENDED RELEASE ORAL at 05:36

## 2019-06-10 RX ADMIN — Medication 2 MILLIUNIT(S)/MIN: at 15:38

## 2019-06-10 RX ADMIN — SODIUM CHLORIDE 125 MILLILITER(S): 9 INJECTION, SOLUTION INTRAVENOUS at 18:04

## 2019-06-10 RX ADMIN — Medication 1 SPRAY(S): at 22:57

## 2019-06-10 RX ADMIN — SODIUM CHLORIDE 2000 MILLILITER(S): 9 INJECTION, SOLUTION INTRAVENOUS at 14:15

## 2019-06-10 RX ADMIN — SODIUM CHLORIDE 125 MILLILITER(S): 9 INJECTION, SOLUTION INTRAVENOUS at 07:16

## 2019-06-10 NOTE — CHART NOTE - NSCHARTNOTEFT_GEN_A_CORE
OB Attending    Pt seen at bedside, plan of care discussed.  Comfortable with epidural.  Pitocin for continued labor induction.    MD June

## 2019-06-10 NOTE — CHART NOTE - NSCHARTNOTEFT_GEN_A_CORE
R1 prog note    Pt examined due to increased pain, requesting pain management      VE:3/90/-3  EFM:120/mod/+accels/-decels  [place]:Q5min      T(C): 36.2 (06-10-19 @ 11:28), Max: 37.4 (06-10-19 @ 09:35)  HR: 93 (06-10-19 @ 11:28) (74 - 118)  BP: 118/79 (06-10-19 @ 11:28) (95/51 - 130/69)  RR: 15 (06-10-19 @ 11:28) (15 - 18)  SpO2: 96% (06-10-19 @ 07:13) (92% - 100%)      Plan:  -Approved for epidural by Dr. Oh, in house  -Will d/c PO and start pitocin at 320p      D/w Dr. Althea nichole pgy-1

## 2019-06-10 NOTE — CHART NOTE - NSCHARTNOTEFT_GEN_A_CORE
R2 Note    Pt evaluated for cervical change. Request for analgesia.   SVE:1.5/060/-3  129/72  HR:88  FHM:120/mod khanh/+accel/-decel  Cherry: q4-5 mins    Cat 1 tracing. Will give morphine 4mg IVP/ 4mg SubQ.   D/w Dr. Carroll   RShibata R2 Note    Pt evaluated for cervical change. Request for analgesia.   SVE:1.5/060/-3  129/72  HR:88  FHM:120/mod khanh/+accel/-decel  [place]: q4-5 mins    Cat 1 tracing. Will give morphine 4mg IVP/ 4mg SubQ. IOL for AMA and Cat 2 tracing. C/w po cytotec.   D/w Dr. Carroll   RShibata

## 2019-06-10 NOTE — CHART NOTE - NSCHARTNOTEFT_GEN_A_CORE
OB attending:  pt resting comfortable s/p epidural  VE: 6cm/90%/vtx/R-clear/-2  tracing category 1  ctxs every 2-4 minutes  Pitocin at 4 mu/minute  A: Early active phase of labor  P: continue present management-increase pitocin as needed

## 2019-06-11 RX ORDER — SODIUM CHLORIDE 9 MG/ML
1000 INJECTION, SOLUTION INTRAVENOUS
Refills: 0 | Status: DISCONTINUED | OUTPATIENT
Start: 2019-06-11 | End: 2019-06-12

## 2019-06-11 RX ORDER — LANOLIN
1 OINTMENT (GRAM) TOPICAL EVERY 6 HOURS
Refills: 0 | Status: DISCONTINUED | OUTPATIENT
Start: 2019-06-11 | End: 2019-06-14

## 2019-06-11 RX ORDER — METOCLOPRAMIDE HCL 10 MG
10 TABLET ORAL ONCE
Refills: 0 | Status: DISCONTINUED | OUTPATIENT
Start: 2019-06-11 | End: 2019-06-11

## 2019-06-11 RX ORDER — IBUPROFEN 200 MG
600 TABLET ORAL EVERY 6 HOURS
Refills: 0 | Status: DISCONTINUED | OUTPATIENT
Start: 2019-06-11 | End: 2019-06-14

## 2019-06-11 RX ORDER — HEPARIN SODIUM 5000 [USP'U]/ML
5000 INJECTION INTRAVENOUS; SUBCUTANEOUS EVERY 12 HOURS
Refills: 0 | Status: DISCONTINUED | OUTPATIENT
Start: 2019-06-11 | End: 2019-06-14

## 2019-06-11 RX ORDER — MAGNESIUM HYDROXIDE 400 MG/1
30 TABLET, CHEWABLE ORAL
Refills: 0 | Status: DISCONTINUED | OUTPATIENT
Start: 2019-06-11 | End: 2019-06-14

## 2019-06-11 RX ORDER — OXYCODONE HYDROCHLORIDE 5 MG/1
5 TABLET ORAL ONCE
Refills: 0 | Status: DISCONTINUED | OUTPATIENT
Start: 2019-06-11 | End: 2019-06-14

## 2019-06-11 RX ORDER — LORATADINE 10 MG/1
1 TABLET ORAL
Qty: 0 | Refills: 0 | DISCHARGE

## 2019-06-11 RX ORDER — TETANUS TOXOID, REDUCED DIPHTHERIA TOXOID AND ACELLULAR PERTUSSIS VACCINE, ADSORBED 5; 2.5; 8; 8; 2.5 [IU]/.5ML; [IU]/.5ML; UG/.5ML; UG/.5ML; UG/.5ML
0.5 SUSPENSION INTRAMUSCULAR ONCE
Refills: 0 | Status: COMPLETED | OUTPATIENT
Start: 2019-06-11

## 2019-06-11 RX ORDER — IBUPROFEN 200 MG
600 TABLET ORAL EVERY 6 HOURS
Refills: 0 | Status: COMPLETED | OUTPATIENT
Start: 2019-06-11 | End: 2020-05-09

## 2019-06-11 RX ORDER — DIPHENHYDRAMINE HCL 50 MG
25 CAPSULE ORAL EVERY 6 HOURS
Refills: 0 | Status: DISCONTINUED | OUTPATIENT
Start: 2019-06-11 | End: 2019-06-14

## 2019-06-11 RX ORDER — OXYCODONE HYDROCHLORIDE 5 MG/1
5 TABLET ORAL
Refills: 0 | Status: DISCONTINUED | OUTPATIENT
Start: 2019-06-11 | End: 2019-06-14

## 2019-06-11 RX ORDER — DOCUSATE SODIUM 100 MG
100 CAPSULE ORAL
Refills: 0 | Status: DISCONTINUED | OUTPATIENT
Start: 2019-06-11 | End: 2019-06-14

## 2019-06-11 RX ORDER — SIMETHICONE 80 MG/1
80 TABLET, CHEWABLE ORAL EVERY 4 HOURS
Refills: 0 | Status: DISCONTINUED | OUTPATIENT
Start: 2019-06-11 | End: 2019-06-14

## 2019-06-11 RX ORDER — KETOROLAC TROMETHAMINE 30 MG/ML
30 SYRINGE (ML) INJECTION EVERY 6 HOURS
Refills: 0 | Status: DISCONTINUED | OUTPATIENT
Start: 2019-06-11 | End: 2019-06-11

## 2019-06-11 RX ORDER — FAMOTIDINE 10 MG/ML
20 INJECTION INTRAVENOUS ONCE
Refills: 0 | Status: DISCONTINUED | OUTPATIENT
Start: 2019-06-11 | End: 2019-06-11

## 2019-06-11 RX ORDER — ACETAMINOPHEN 500 MG
975 TABLET ORAL EVERY 6 HOURS
Refills: 0 | Status: DISCONTINUED | OUTPATIENT
Start: 2019-06-11 | End: 2019-06-14

## 2019-06-11 RX ORDER — GLYCERIN ADULT
1 SUPPOSITORY, RECTAL RECTAL AT BEDTIME
Refills: 0 | Status: DISCONTINUED | OUTPATIENT
Start: 2019-06-11 | End: 2019-06-14

## 2019-06-11 RX ORDER — OXYTOCIN 10 UNIT/ML
41.67 VIAL (ML) INJECTION
Qty: 20 | Refills: 0 | Status: DISCONTINUED | OUTPATIENT
Start: 2019-06-11 | End: 2019-06-12

## 2019-06-11 RX ORDER — OXYTOCIN 10 UNIT/ML
333.33 VIAL (ML) INJECTION
Qty: 20 | Refills: 0 | Status: DISCONTINUED | OUTPATIENT
Start: 2019-06-11 | End: 2019-06-12

## 2019-06-11 RX ORDER — CITRIC ACID/SODIUM CITRATE 300-500 MG
30 SOLUTION, ORAL ORAL ONCE
Refills: 0 | Status: DISCONTINUED | OUTPATIENT
Start: 2019-06-11 | End: 2019-06-11

## 2019-06-11 RX ADMIN — Medication 30 MILLIGRAM(S): at 15:07

## 2019-06-11 RX ADMIN — HEPARIN SODIUM 5000 UNIT(S): 5000 INJECTION INTRAVENOUS; SUBCUTANEOUS at 21:34

## 2019-06-11 RX ADMIN — Medication 10 MILLIGRAM(S): at 00:22

## 2019-06-11 RX ADMIN — Medication 975 MILLIGRAM(S): at 20:06

## 2019-06-11 RX ADMIN — Medication 30 MILLIGRAM(S): at 09:21

## 2019-06-11 RX ADMIN — Medication 100 MILLIGRAM(S): at 20:07

## 2019-06-11 RX ADMIN — HEPARIN SODIUM 5000 UNIT(S): 5000 INJECTION INTRAVENOUS; SUBCUTANEOUS at 09:20

## 2019-06-11 RX ADMIN — Medication 975 MILLIGRAM(S): at 21:00

## 2019-06-11 RX ADMIN — Medication 30 MILLIGRAM(S): at 15:25

## 2019-06-11 RX ADMIN — Medication 975 MILLIGRAM(S): at 11:35

## 2019-06-11 RX ADMIN — Medication 125 MILLIUNIT(S)/MIN: at 04:02

## 2019-06-11 RX ADMIN — FAMOTIDINE 20 MILLIGRAM(S): 10 INJECTION INTRAVENOUS at 00:22

## 2019-06-11 RX ADMIN — Medication 30 MILLIGRAM(S): at 10:00

## 2019-06-11 RX ADMIN — Medication 30 MILLILITER(S): at 00:22

## 2019-06-11 RX ADMIN — Medication 975 MILLIGRAM(S): at 12:05

## 2019-06-11 RX ADMIN — SIMETHICONE 80 MILLIGRAM(S): 80 TABLET, CHEWABLE ORAL at 20:07

## 2019-06-11 NOTE — DISCHARGE NOTE OB - PATIENT PORTAL LINK FT
Consult [facility]- [id number]  thanks will follow   Chandrakant Ames MD  [phone]     You can access the [email or web address] Portal, offered by [facility], by registering with the following website: [email or web address]

## 2019-06-11 NOTE — OB NEONATOLOGY/PEDIATRICIAN DELIVERY SUMMARY - NSPEDSNEONOTESA_OBGYN_ALL_OB_FT
Baby is a 39.2 week GA M born to a 45 y/o  mother via CS for failure of dilation. Maternal history significant for hypothyroidism, not on any medications. Pregnancy uncomplicated. Maternal blood type A+. Prenatal labs neg/NR/imm. GBS  (neg on 5/10). SROM at 1410, with clear fluid, rom 11 hrs. Baby born vigorous and crying spontaneously. Warmed, dried, stimulated. Apgars 9 / 9. EOS 0.28. Breast feed; wants hep b and circ. Voided in

## 2019-06-11 NOTE — DISCHARGE NOTE OB - HOSPITAL COURSE
Patient received PO cytotec and pitocin but had arrest of dilation at 8 cm. Primary c/s for viable male infant

## 2019-06-11 NOTE — DISCHARGE NOTE OB - CARE PROVIDER_API CALL
Paris Khalil)  Obstetrics and Gynecology  [address], [address]  [place] [address]  Phone: [phone]  Fax: [phone]  Follow Up Time:

## 2019-06-11 NOTE — CHART NOTE - NSCHARTNOTEFT_GEN_A_CORE
Asked to see patient to assess the integrity of the dressing.    S/P Primary C/S for Arrest, at 1:04 AM today.  EBL - 900  Vital Signs - 36.6, 94, 93/55, 18, 98%    Found patient in bed in no apparent distress.   Offers no complaints    Dressing noted to be coming off, and not covering the incision, due to a lack of tape.  Dressing removed, and a new pressure dressing was applied.    No further action at this time. Continue routine Post Op care.

## 2019-06-12 LAB
BASOPHILS # BLD AUTO: 0.02 K/UL — SIGNIFICANT CHANGE UP (ref 0–0.2)
BASOPHILS NFR BLD AUTO: 0.2 % — SIGNIFICANT CHANGE UP (ref 0–2)
EOSINOPHIL # BLD AUTO: 0.08 K/UL — SIGNIFICANT CHANGE UP (ref 0–0.5)
EOSINOPHIL NFR BLD AUTO: 0.9 % — SIGNIFICANT CHANGE UP (ref 0–6)
HCT VFR BLD CALC: 29.1 % — LOW (ref 34.5–45)
HGB BLD-MCNC: 9.3 G/DL — LOW (ref 11.5–15.5)
IMM GRANULOCYTES NFR BLD AUTO: 0.9 % — SIGNIFICANT CHANGE UP (ref 0–1.5)
LYMPHOCYTES # BLD AUTO: 1.67 K/UL — SIGNIFICANT CHANGE UP (ref 1–3.3)
LYMPHOCYTES # BLD AUTO: 18.7 % — SIGNIFICANT CHANGE UP (ref 13–44)
MCHC RBC-ENTMCNC: 27.6 PG — SIGNIFICANT CHANGE UP (ref 27–34)
MCHC RBC-ENTMCNC: 32 % — SIGNIFICANT CHANGE UP (ref 32–36)
MCV RBC AUTO: 86.4 FL — SIGNIFICANT CHANGE UP (ref 80–100)
MONOCYTES # BLD AUTO: 0.38 K/UL — SIGNIFICANT CHANGE UP (ref 0–0.9)
MONOCYTES NFR BLD AUTO: 4.3 % — SIGNIFICANT CHANGE UP (ref 2–14)
NEUTROPHILS # BLD AUTO: 6.7 K/UL — SIGNIFICANT CHANGE UP (ref 1.8–7.4)
NEUTROPHILS NFR BLD AUTO: 75 % — SIGNIFICANT CHANGE UP (ref 43–77)
NRBC # FLD: 0.03 K/UL — SIGNIFICANT CHANGE UP (ref 0–0)
PLATELET # BLD AUTO: 181 K/UL — SIGNIFICANT CHANGE UP (ref 150–400)
PMV BLD: 11 FL — SIGNIFICANT CHANGE UP (ref 7–13)
RBC # BLD: 3.37 M/UL — LOW (ref 3.8–5.2)
RBC # FLD: 15.3 % — HIGH (ref 10.3–14.5)
WBC # BLD: 8.93 K/UL — SIGNIFICANT CHANGE UP (ref 3.8–10.5)
WBC # FLD AUTO: 8.93 K/UL — SIGNIFICANT CHANGE UP (ref 3.8–10.5)

## 2019-06-12 RX ORDER — OXYCODONE HYDROCHLORIDE 5 MG/1
5 TABLET ORAL ONCE
Refills: 0 | Status: DISCONTINUED | OUTPATIENT
Start: 2019-06-12 | End: 2019-06-14

## 2019-06-12 RX ADMIN — Medication 975 MILLIGRAM(S): at 10:35

## 2019-06-12 RX ADMIN — SIMETHICONE 80 MILLIGRAM(S): 80 TABLET, CHEWABLE ORAL at 11:59

## 2019-06-12 RX ADMIN — Medication 975 MILLIGRAM(S): at 21:14

## 2019-06-12 RX ADMIN — HEPARIN SODIUM 5000 UNIT(S): 5000 INJECTION INTRAVENOUS; SUBCUTANEOUS at 21:14

## 2019-06-12 RX ADMIN — Medication 600 MILLIGRAM(S): at 11:59

## 2019-06-12 RX ADMIN — Medication 600 MILLIGRAM(S): at 12:30

## 2019-06-12 RX ADMIN — Medication 600 MILLIGRAM(S): at 17:05

## 2019-06-12 RX ADMIN — Medication 975 MILLIGRAM(S): at 22:00

## 2019-06-12 RX ADMIN — Medication 100 MILLIGRAM(S): at 05:24

## 2019-06-12 RX ADMIN — Medication 100 MILLIGRAM(S): at 11:59

## 2019-06-12 RX ADMIN — Medication 600 MILLIGRAM(S): at 05:24

## 2019-06-12 RX ADMIN — Medication 975 MILLIGRAM(S): at 10:07

## 2019-06-12 RX ADMIN — Medication 600 MILLIGRAM(S): at 06:27

## 2019-06-12 RX ADMIN — SIMETHICONE 80 MILLIGRAM(S): 80 TABLET, CHEWABLE ORAL at 05:25

## 2019-06-12 RX ADMIN — HEPARIN SODIUM 5000 UNIT(S): 5000 INJECTION INTRAVENOUS; SUBCUTANEOUS at 10:07

## 2019-06-12 RX ADMIN — Medication 600 MILLIGRAM(S): at 17:28

## 2019-06-12 NOTE — LACTATION INITIAL EVALUATION - INTERVENTION OUTCOME
good return demonstration/verbalizes understanding/demonstrates understanding of teaching/reviewed benefits of breastfeeding, benefits of rooming in, wet and soiled diaper log, feeding cues, feeding on demand, manual expression of breast milk, safe sleep practices and safe skin to skin;  encouraged to ask for assistance as needed
good return demonstration/needs met/verbalizes understanding/demonstrates understanding of teaching

## 2019-06-12 NOTE — LACTATION INITIAL EVALUATION - LACTATION INTERVENTIONS
initiate skin to skin/assisted to put baby to rt. breast in football hold position with deep latch and sucking with stimulation
Assisted with positioning to facilitate deep latch.  Encouraged to feed on cue and follow the feeding log ,reviewed.  Taught hand expression.  Discussed outpatient resources available, warm line , breastfeeding support group.  Encouraged to call for assistance, as needed./initiate skin to skin/initiate hand expression routine

## 2019-06-12 NOTE — LACTATION INITIAL EVALUATION - LATCH: LATCH INFANT
(1) repeated attempts, holds nipple in mouth, stimulate to suck
(2) grasps breast, tongue down, lips flanged, rhythmic sucking

## 2019-06-12 NOTE — PROGRESS NOTE ADULT - PROBLEM SELECTOR PLAN 1
- Continue regular diet.  - Increase ambulation.  - Continue motrin, tylenol, oxycodone PRN for pain control.      Blanche Fregoso PGY-1

## 2019-06-13 RX ORDER — TETANUS TOXOID, REDUCED DIPHTHERIA TOXOID AND ACELLULAR PERTUSSIS VACCINE, ADSORBED 5; 2.5; 8; 8; 2.5 [IU]/.5ML; [IU]/.5ML; UG/.5ML; UG/.5ML; UG/.5ML
0.5 SUSPENSION INTRAMUSCULAR ONCE
Refills: 0 | Status: COMPLETED | OUTPATIENT
Start: 2019-06-13 | End: 2019-06-14

## 2019-06-13 RX ADMIN — Medication 975 MILLIGRAM(S): at 21:01

## 2019-06-13 RX ADMIN — HEPARIN SODIUM 5000 UNIT(S): 5000 INJECTION INTRAVENOUS; SUBCUTANEOUS at 12:49

## 2019-06-13 RX ADMIN — Medication 600 MILLIGRAM(S): at 05:58

## 2019-06-13 RX ADMIN — Medication 600 MILLIGRAM(S): at 00:08

## 2019-06-13 RX ADMIN — Medication 600 MILLIGRAM(S): at 19:23

## 2019-06-13 RX ADMIN — Medication 975 MILLIGRAM(S): at 02:59

## 2019-06-13 RX ADMIN — Medication 975 MILLIGRAM(S): at 22:01

## 2019-06-13 RX ADMIN — Medication 600 MILLIGRAM(S): at 13:30

## 2019-06-13 RX ADMIN — Medication 600 MILLIGRAM(S): at 18:24

## 2019-06-13 RX ADMIN — Medication 600 MILLIGRAM(S): at 00:40

## 2019-06-13 RX ADMIN — Medication 975 MILLIGRAM(S): at 03:30

## 2019-06-13 RX ADMIN — Medication 600 MILLIGRAM(S): at 12:49

## 2019-06-13 NOTE — PROGRESS NOTE ADULT - SUBJECTIVE AND OBJECTIVE BOX
Postpartum Note,  Section  She is a  44y woman who is now post-operative day: 2 Late entry    Subjective:  The patient feels well.  She is ambulating.   She is tolerating regular diet.  She denies nausea and vomiting.  She is voiding.  Her pain is controlled.  She reports normal postpartum bleeding.    Physical exam:    Vital Signs Last 24 Hrs  T(C): 36.7 (2019 05:58), Max: 36.9 (2019 22:00)  T(F): 98 (2019 05:58), Max: 98.4 (2019 22:00)  HR: 80 (2019 05:58) (80 - 86)  BP: 112/71 (2019 05:58) (111/69 - 115/69)  BP(mean): --  RR: 17 (2019 05:58) (16 - 17)  SpO2: 99% (2019 05:58) (99% - 99%)    Gen: NAD  Breast: Soft, nontender, not engorged.  Abdomen: Soft, nontender, no distension , firm uterine fundus at umbilicus.  Incision: Clean, dry, and intact  Pelvic: Normal lochia noted  Ext: No calf tenderness    LABS:      Rubella status:     Allergies    No Known Allergies    Intolerances      MEDICATIONS  (STANDING):  acetaminophen   Tablet .. 975 milliGRAM(s) Oral every 6 hours  diphtheria/tetanus/pertussis (acellular) Vaccine (ADAcel) 0.5 milliLiter(s) IntraMuscular once  heparin  Injectable 5000 Unit(s) SubCutaneous every 12 hours  ibuprofen  Tablet. 600 milliGRAM(s) Oral every 6 hours    MEDICATIONS  (PRN):  diphenhydrAMINE 25 milliGRAM(s) Oral every 6 hours PRN Itching  docusate sodium 100 milliGRAM(s) Oral two times a day PRN Stool softening  glycerin Suppository - Adult 1 Suppository(s) Rectal at bedtime PRN Constipation  lanolin Ointment 1 Application(s) Topical every 6 hours PRN Sore Nipples  magnesium hydroxide Suspension 30 milliLiter(s) Oral two times a day PRN Constipation  oxyCODONE    IR 5 milliGRAM(s) Oral every 3 hours PRN Moderate Pain (4 - 6)  oxyCODONE    IR 5 milliGRAM(s) Oral once PRN Severe Pain (7 - 10)  oxyCODONE    IR 5 milliGRAM(s) Oral once PRN Severe Pain (7 - 10)  simethicone 80 milliGRAM(s) Chew every 4 hours PRN Gas        Assessment and Plan  POD #2 s/p  section  Doing well.  Encourage ambulation discharge for am
Anesthesia Post-op Note    POD#1 S/P C/S    Patient is ambulating out of bed, no complaints of headache. All questions answered.  No anesthesia related complications.
OB Progress Note:  Delivery, POD#1    S: 45yo POD#1 s/p LTCS . Her pain is well controlled. She is tolerating a regular diet and passing flatus. Denies N/V. Denies CP/SOB/lightheadedness/dizziness. She is ambulating without difficulty. Voiding spontaneously.    O:   Vital Signs Last 24 Hrs  T(C): 36.9 (2019 05:31), Max: 36.9 (2019 22:25)  T(F): 98.5 (2019 05:31), Max: 98.5 (2019 22:25)  HR: 90 (2019 05:31) (80 - 94)  BP: 91/66 (2019 05:31) (91/59 - 97/56)  BP(mean): --  RR: 17 (2019 05:31) (16 - 18)  SpO2: 99% (2019 05:31) (97% - 99%)    Labs:  Blood type: A Positive  Rubella IgG: RPR: Negative                          9.3<L>   8.93 >-----------< 181    (  @ 06:00 )             29.1<L>                        12.6   9.92 >-----------< 182    (  @ 20:55 )             38.7                        13.2   9.88 >-----------< 205    (  @ 16:23 )             40.3    19 @ 16:23      138  |  102  |  11  ----------------------------<  105<H>  4.2   |  20<L>  |  0.66        Ca    9.9      2019 16:23    TPro  6.4  /  Alb  3.4  /  TBili  0.2  /  DBili  x   /  AST  23  /  ALT  15  /  AlkPhos  132<H>  19 @ 16:23          PE:  General: NAD  Abdomen: Mildly distended, appropriately tender, incision c/d/i.  Extremities: No erythema, no pitting edema
Postop Day  __1_ s/p   C- Section    THERAPY:  [  ] Spinal morphine   [ x ] Epidural morphine   [  ] IV PCA Hydromorphone 1 mg/ml    Refer to Charted Pain Scores for Pain Scale score    Sedation Score:	  Alert	    Side Effects:	   None	     Gross Neurological Exam within normal limits    ASSESSMENT/ PLAN     Documentation and Verification of current medications complete.  Change to PRN PO Analgesics
SUBJECTIVE:    Pain: well controlled  Complaints: none    MILESTONES:    Alert and oriented x 3  [ x ]  Out of bed/ ambulating. [ x ]  Flatus: [x  ]  Postive [  ] Negative   Bowel movement  [x  ] Positive [  ] Negative   Voiding [x  ] Due to void [  ]   Diet: Regular [x  ]  Clears [  ]  NPO [  ]  Infant feeding:  Breast [ x ]   Bottle [  ]  Both [  ]  Feeding related inssues and/or concerns: none      OBJECTIVE:  T(C): 36.8 (19 @ 05:24), Max: 36.8 (19 @ 14:51)  HR: 84 (19 @ 05:24) (80 - 88)  BP: 103/60 (19 @ 05:24) (95/56 - 106/70)  RR: 17 (19 @ 05:24) (16 - 17)  SpO2: 99% (19 @ 05:24) (97% - 99%)  Wt(kg): --                        9.3    8.93  )-----------( 181      ( 2019 06:00 )             29.1     MEDICATIONS  (STANDING):  acetaminophen   Tablet .. 975 milliGRAM(s) Oral every 6 hours  diphtheria/tetanus/pertussis (acellular) Vaccine (ADAcel) 0.5 milliLiter(s) IntraMuscular once  heparin  Injectable 5000 Unit(s) SubCutaneous every 12 hours  ibuprofen  Tablet. 600 milliGRAM(s) Oral every 6 hours    MEDICATIONS  (PRN):  diphenhydrAMINE 25 milliGRAM(s) Oral every 6 hours PRN Itching  docusate sodium 100 milliGRAM(s) Oral two times a day PRN Stool softening  glycerin Suppository - Adult 1 Suppository(s) Rectal at bedtime PRN Constipation  lanolin Ointment 1 Application(s) Topical every 6 hours PRN Sore Nipples  magnesium hydroxide Suspension 30 milliLiter(s) Oral two times a day PRN Constipation  oxyCODONE    IR 5 milliGRAM(s) Oral every 3 hours PRN Moderate Pain (4 - 6)  oxyCODONE    IR 5 milliGRAM(s) Oral once PRN Severe Pain (7 - 10)  oxyCODONE    IR 5 milliGRAM(s) Oral once PRN Severe Pain (7 - 10)  simethicone 80 milliGRAM(s) Chew every 4 hours PRN Gas        ASSESSMENT:  44y  y/o G 4  P 1   PO Day# 2       Delivery: Primary [x  ]    Repeat [  ]                                       Indication of procedure: Arrest  Condition: Stable  Past Medical History significant for: HPI:  44yo  @ 39.0 presents with c/o headache since 1300. Reports pain 7/10 and similar to her typical migraines. Also reports seeing spots which is also typical of her migraines. Denies nausea/vomiting, epigastric pain.   Also reports mild ctx every 5 minutes. Denies LOF and reports bloody show and GFM.   NPO since 1400.     H/O 2018 Right Lumpectomy, benign    OB H/O SAB x 2  ETOP x 1    Ap course complicated by:  Donor egg as per pre- record.  Low lying placenta-resolved  Patient scheduled for Primary Elective C/S on . Unsure if she wants to labor or have C/S.  GBS negative 5/10  EFW this week 8-0 (2019 19:32)    Current Issues: none  Heart:        RRR                      Lungs: clear  Breasts:  Soft [x  ]   Engorged [  ]  Abdomen: Soft [ x ] , distended [  ] nontender [  ]   Bowel sounds :  Present [ x ]  Absent [  ]   Fundus firm [ x ]  Boggy [  ]  Abdominal incision: Clean, dry and intact [ x ]  Staples [  ] Steri Strips [ x ] Dermabond [  ] Sutures [  ]  Patient wearing abdominal binder for support.  Vaginal: Lochia:  Heavy [  ]  Moderate [  ]   Scant [x  ]  Extremities: Edema [ 2+ ] negative Efrain's Sign [ x ] Nontender Pee  [ x ] Positive pedal pulses [ x ]  Other relevant physical exam findings:none      PLAN:  Plan: Increase ambulation, analgesia PRN and pain medication protocol standing oxycodone, ibuprofen and acetaminophen.  Diet: Regular diet  Continue routine post-operative and postpartum care.

## 2019-06-14 RX ADMIN — HEPARIN SODIUM 5000 UNIT(S): 5000 INJECTION INTRAVENOUS; SUBCUTANEOUS at 00:38

## 2019-06-14 RX ADMIN — Medication 600 MILLIGRAM(S): at 01:34

## 2019-06-14 RX ADMIN — TETANUS TOXOID, REDUCED DIPHTHERIA TOXOID AND ACELLULAR PERTUSSIS VACCINE, ADSORBED 0.5 MILLILITER(S): 5; 2.5; 8; 8; 2.5 SUSPENSION INTRAMUSCULAR at 10:22

## 2019-06-14 RX ADMIN — Medication 600 MILLIGRAM(S): at 00:38

## 2019-06-14 RX ADMIN — Medication 600 MILLIGRAM(S): at 06:20

## 2019-06-15 VITALS — DIASTOLIC BLOOD PRESSURE: 59 MMHG | HEART RATE: 60 BPM | TEMPERATURE: 98 F | SYSTOLIC BLOOD PRESSURE: 100 MMHG

## 2020-04-22 NOTE — OB PROVIDER TRIAGE NOTE - NSRISKFACTORSDETA_OBGYN_ALL_OB
PATIENT INSTRUCTIONS    Treatment:  Medication:  Order Norco        Follow-Up:  Call or return to the clinic as needed if these symptoms worsen fail to improve as anticipated, or if new symptoms develop.                 Time left: 4/22/2020 9:53 AM     Next appointment:        Location:        Provider:         Please note: 24 hour notice for cancellation of appointment is required.    You may receive a survey in the mail, or via the e-mail address that you have provided.  We would appreciate if you could fill out the survey and provide us with any feedback on your experience regarding your visit today. Thank you for allowing us to provide you with your health care needs.     Do not hesitate to call if you are experiencing severe pain, worsening or change in your pain, have symptoms of infection (fever, warmth, redness, increased drainage), or have any other problem that concerns you ~ [phone] (or [phone] after hours).    Please remember when requesting refills on pain medication that the request should be made by Thursday at the latest. [facility] is open Monday-Friday, 8am-5pm, and closed on the weekends.  No narcotic refills will be filled after hours.    Additional Educational Resources:  For additional resources regarding your symptoms, diagnosis, or further health information, please visit the Health Resources section on [email or web address] or the Online Health Resources section in [employer].      
Pregnancy resulting from Infertility treatment

## 2021-11-14 NOTE — DISCHARGE NOTE OB - CARE PLAN
Pf[employer] dose 1 and 2 Principal Discharge DX:	 delivery delivered  Goal:	return to normal DLS  Assessment and plan of treatment:	stable; vaginal delivery

## 2022-05-09 NOTE — OB PROVIDER TRIAGE NOTE - LIVING CHILDREN, OB PROFILE
Patient Education       Well Child Exam 6 Months   About this topic   Your baby's 6-month well child exam is a visit with the doctor to check your baby's health. The doctor measures your baby's weight, height, and head size. The doctor plots these numbers on a growth curve. The growth curve gives a picture of your baby's growth at each visit. The doctor may listen to your baby's heart, lungs, and belly. Your doctor will do a full exam of your baby from the head to the toes.  Your baby may also need shots or blood tests during this visit.  General   Growth and Development   Your doctor will ask you how your baby is developing. The doctor will focus on the skills that most children your baby's age are expected to do. During the first months of your baby's life, here are some things you can expect.  · Movement ? Your baby may:  ? Begin to sit up without help  ? Move a toy from one hand to the other  ? Roll from front to back and back to front  ? Use the legs to stand with your help  ? Be able to move forward or backward while on the belly  ? Become more mobile  ? Put everything in the mouth  § Never leave small objects within reach.  § Do not feed your baby hot dogs or hard food that could lead to choking.  § Cut all food into small pieces.  § Learn what to do if your baby chokes.  · Hearing, seeing, and talking ? Your baby will likely:  ? Make lots of babbling noises  ? May say things like da-da-da or ba-ba-ba or ma-ma-ma  ? Show a wide range of emotions on the face  ? Be more comfortable with familiar people and toys  ? Respond to their own name  ? Likes to look at self in mirror  · Feeding ? Your baby:  ? Takes breast milk or formula for most nutrition. Always hold your baby when feeding. Do not prop a bottle. Propping the bottle makes it easier for your baby to choke and get ear infections.  ? May be ready to start eating cereal and other baby foods. Signs your baby is ready are when your baby:  § Sits without  much support  § Has good head and neck control  § Shows interest in food you are eating  § Opens the mouth for a spoon  § Able to grasp and bring things up to mouth  ? Can start to eat thin cereal or pureed meats. Then, add fruits and vegetables.  § Do not add cereal to your baby's bottle. Feed it to your baby with a spoon.  § Do not force your baby to eat baby foods. You may have to offer a food more than 10 times before your baby will like it.  § It is OK to try giving your baby very small bites of soft finger foods like bananas or well cooked vegetables. If your baby coughs or chokes, then try again another time.  § Watch for signs your baby is full like turning the head or leaning back.  ? May start to have teeth. If so, brush them 2 times each day with a smear of toothpaste. Use a cold clean wash cloth or teething ring to help ease sore gums.  ? Will need you to clean the teeth after a feeding with a wet washcloth or a wet baby toothbrush. You may use a smear of toothpaste each day.  · Sleep ? Your baby:  ? Should still sleep in a safe crib, on the back, alone for naps and at night. Keep soft bedding, bumpers, loose blankets, and toys out of your baby's bed. It is OK if your baby rolls over without help at night.  ? Is likely sleeping about 6 to 8 hours in a row at night  ? Needs 2 to 3 naps each day  ? Sleeps about a total of 14 to 15 hours each day  ? Needs to learn how to fall asleep without help. Put your baby to bed while still awake. Your baby may cry. Check on your baby every 10 minutes or so until your baby falls asleep. Your baby will slowly learn to fall asleep.  ? Should not have a bottle in bed. This can cause tooth decay or ear infections. Give a bottle before putting your baby in the crib for the night.  ? Should sleep in a crib that is away from windows.  · Shots or vaccines ? It is important for your baby to get shots on time. This protects from very serious illnesses like lung infections,  meningitis, or infections that damage their nervous system. Your baby may need:  ? DTaP or diphtheria, tetanus, and pertussis vaccine  ? Hib or Haemophilus influenzae type b vaccine  ? IPV or polio vaccine  ? PCV or pneumococcal conjugate vaccine  ? RV or rotavirus vaccine  ? HepB or hepatitis B vaccine  ? Influenza vaccine  ? Some of these vaccines may be given as combined vaccines. This means your child may get fewer shots.  Help for Parents   · Play with your baby.  ? Tummy time is still important. It helps your baby develop arm and shoulder muscles. Do tummy time a few times each day while your baby is awake. Put a colorful toy in front of your baby to give something to look at or play with.  ? Read to your baby. Talk and sing to your baby. This helps your baby learn language skills.  ? Give your child toys that are safe to chew on. Most things will end up in your child's mouth, so keep away small objects and plastic bags.  ? Play peekaboo with your baby.  · Here are some things you can do to help keep your baby safe and healthy.  ? Do not allow anyone to smoke in your home or around your baby. Second hand smoke can harm your baby.  ? Have the right size car seat for your baby and use it every time your baby is in the car. Your baby should be rear facing until 2 years of age.  ? Keep one hand on the baby whenever you are changing a diaper or clothes.  ? Keep your baby in the shade, rather than in the sun. Doctors dont recommend sunscreen until children are 6 months and older.  ? Take extra care if your baby is in the kitchen.  § Make sure you use the back burners on the stove and turn pot handles so your baby cannot grab them.  § Keep hot items like liquids, coffee pots, and heaters away from your baby.  § Put childproof locks on cabinets, especially those that contain cleaning supplies or other things that may harm your baby.  ? Limit how much time your baby spends in an infant seat, bouncy seat, boppy chair,  or swing. Give your baby a safe place to play.  ? Remove or protect sharp edge furniture where your child plays.  ? Use safety latches on drawers and cabinets.  ? Keep cords from shades and blinds away as they can strangle your child.  ? Never leave your baby alone. Do not leave your child in the car, in the bath, or at home alone, even for a few minutes.  ? Avoid screen time for children under 2 years old. This means no TV, computers, or video games. They can cause problems with brain development.  · Parents need to think about:  ? How you will handle a sick child. Do you have alternate day care plans? Can you take off work or school?  ? How to childproof your home. Look for areas that may be a danger to a young child. Keep choking hazards, poisons, and hot objects out of a child's reach.  ? Do you live in an older home that may need to be tested for lead?  · Your next well child visit will most likely be when your baby is 9 months old. At this visit your doctor may:  ? Do a full check up on your baby  ? Talk about how your baby is sleeping and eating  ? Give your baby the next set of shots  ? Get their vision checked.         When do I need to call the doctor?   · Fever of 100.4°F (38°C) or higher  · Having problems eating or spits up a lot  · Sleeps all the time or has trouble sleeping  · Won't stop crying  · You are worried about your baby's development  Where can I learn more?   American Academy of Pediatrics  https://www.healthychildren.org/English/ages-stages/baby/Pages/Hearing-and-Making-Sounds.aspx   American Academy of Pediatrics  https://www.healthychildren.org/English/ages-stages/toddler/Pages/Milestones-During-The-First-2-Years.aspx   Centers for Disease Control and Prevention  https://www.cdc.gov/ncbddd/actearly/milestones/   Centers for Disease Control and Prevention  https://www.cdc.gov/vaccines/parents/downloads/mefypy-ldb-xum-0-6yrs.pdf   Last Reviewed Date   2021  Consumer Information Use  and Disclaimer   This information is not specific medical advice and does not replace information you receive from your health care provider. This is only a brief summary of general information. It does NOT include all information about conditions, illnesses, injuries, tests, procedures, treatments, therapies, discharge instructions or life-style choices that may apply to you. You must talk with your health care provider for complete information about your health and treatment options. This information should not be used to decide whether or not to accept your health care providers advice, instructions or recommendations. Only your health care provider has the knowledge and training to provide advice that is right for you.  Copyright   Copyright © 2021 UpToDate, Inc. and its affiliates and/or licensors. All rights reserved.    Children under the age of 2 years will be restrained in a rear facing child safety seat.   If you have an active [facility] account, please look for your well child questionnaire to come to your [facility] account before your next well child visit.   0

## 2022-06-06 ENCOUNTER — OUTPATIENT (OUTPATIENT)
Dept: OUTPATIENT SERVICES | Facility: HOSPITAL | Age: 47
LOS: 1 days | End: 2022-06-06

## 2022-06-06 VITALS
HEART RATE: 97 BPM | RESPIRATION RATE: 16 BRPM | SYSTOLIC BLOOD PRESSURE: 129 MMHG | OXYGEN SATURATION: 99 % | WEIGHT: 192.02 LBS | HEIGHT: 63 IN | TEMPERATURE: 98 F | DIASTOLIC BLOOD PRESSURE: 87 MMHG

## 2022-06-06 DIAGNOSIS — D24.1 BENIGN NEOPLASM OF RIGHT BREAST: ICD-10-CM

## 2022-06-06 DIAGNOSIS — Z98.891 HISTORY OF UTERINE SCAR FROM PREVIOUS SURGERY: Chronic | ICD-10-CM

## 2022-06-06 DIAGNOSIS — D48.61 NEOPLASM OF UNCERTAIN BEHAVIOR OF RIGHT BREAST: ICD-10-CM

## 2022-06-06 DIAGNOSIS — Z98.890 OTHER SPECIFIED POSTPROCEDURAL STATES: Chronic | ICD-10-CM

## 2022-06-06 DIAGNOSIS — J30.2 OTHER SEASONAL ALLERGIC RHINITIS: ICD-10-CM

## 2022-06-06 LAB
HCG UR QL: NEGATIVE — SIGNIFICANT CHANGE UP
HCT VFR BLD CALC: 41.7 % — SIGNIFICANT CHANGE UP (ref 34.5–45)
HGB BLD-MCNC: 13.7 G/DL — SIGNIFICANT CHANGE UP (ref 11.5–15.5)
MCHC RBC-ENTMCNC: 28.7 PG — SIGNIFICANT CHANGE UP (ref 27–34)
MCHC RBC-ENTMCNC: 32.9 GM/DL — SIGNIFICANT CHANGE UP (ref 32–36)
MCV RBC AUTO: 87.4 FL — SIGNIFICANT CHANGE UP (ref 80–100)
NRBC # BLD: 0 /100 WBCS — SIGNIFICANT CHANGE UP
NRBC # FLD: 0 K/UL — SIGNIFICANT CHANGE UP
PLATELET # BLD AUTO: 319 K/UL — SIGNIFICANT CHANGE UP (ref 150–400)
RBC # BLD: 4.77 M/UL — SIGNIFICANT CHANGE UP (ref 3.8–5.2)
RBC # FLD: 13.1 % — SIGNIFICANT CHANGE UP (ref 10.3–14.5)
WBC # BLD: 8.91 K/UL — SIGNIFICANT CHANGE UP (ref 3.8–10.5)
WBC # FLD AUTO: 8.91 K/UL — SIGNIFICANT CHANGE UP (ref 3.8–10.5)

## 2022-06-06 NOTE — H&P PST ADULT - GASTROINTESTINAL DETAILS
bowel sounds normal/no organomegaly soft/nontender/no distention/no masses palpable/bowel sounds normal

## 2022-06-06 NOTE — H&P PST ADULT - ALLERGY TYPES
Seasonal - asthma , cough/ dust , mold, cats, cough , sneezing/outdoor environmental allergies/indoor environmental allergies

## 2022-06-06 NOTE — H&P PST ADULT - HISTORY OF PRESENT ILLNESS
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 Vidal.

## 2022-06-06 NOTE — H&P PST ADULT - NSICDXPASTMEDICALHX_GEN_ALL_CORE_FT
PAST MEDICAL HISTORY:  Asthma no inhaler use for at least 2 years    Hypothyroidism first trimester was on synthroid then d/c    Papilloma of right breast     Spontaneous  x2

## 2022-06-06 NOTE — H&P PST ADULT - PROBLEM SELECTOR PLAN 1
scheduled for right sonosaviscout of papilloma with Dr. Vidal on 6/23/2022.  Verbal and written pre-op instructions provided to patient. Patient verbalized understanding and will call surgeons office for revised instructions if surgery is rescheduled.   Pepcid for GI prophylaxis provided.   patient given verbal and written instruction with teach back on chlorhexidine shampoo, and the patient verbalized understanding with return demonstration.   Patient aware of need for COVID testing prior to  procedure at a [facility]  and advised to coordinate with surgeon to get tested within 72 hours of procedure.   Urine pregnancy test DOS-Urine cup provided.

## 2022-06-06 NOTE — H&P PST ADULT - NSICDXPASTSURGICALHX_GEN_ALL_CORE_FT
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

## 2022-06-11 PROBLEM — D24.1 BENIGN NEOPLASM OF RIGHT BREAST: Chronic | Status: ACTIVE | Noted: 2022-06-06

## 2022-06-17 ENCOUNTER — APPOINTMENT (OUTPATIENT)
Dept: ULTRASOUND IMAGING | Facility: IMAGING CENTER | Age: 47
End: 2022-06-17
Payer: COMMERCIAL

## 2022-06-17 ENCOUNTER — OUTPATIENT (OUTPATIENT)
Dept: OUTPATIENT SERVICES | Facility: HOSPITAL | Age: 47
LOS: 1 days | End: 2022-06-17
Payer: COMMERCIAL

## 2022-06-17 DIAGNOSIS — Z98.891 HISTORY OF UTERINE SCAR FROM PREVIOUS SURGERY: Chronic | ICD-10-CM

## 2022-06-17 DIAGNOSIS — Z00.8 ENCOUNTER FOR OTHER GENERAL EXAMINATION: ICD-10-CM

## 2022-06-17 DIAGNOSIS — Z98.890 OTHER SPECIFIED POSTPROCEDURAL STATES: Chronic | ICD-10-CM

## 2022-06-17 PROCEDURE — C1739: CPT

## 2022-06-17 PROCEDURE — 19285 PERQ DEV BREAST 1ST US IMAG: CPT

## 2022-06-17 PROCEDURE — 19285 PERQ DEV BREAST 1ST US IMAG: CPT | Mod: RT

## 2022-06-19 ENCOUNTER — NON-APPOINTMENT (OUTPATIENT)
Age: 47
End: 2022-06-19

## 2022-06-22 ENCOUNTER — TRANSCRIPTION ENCOUNTER (OUTPATIENT)
Age: 47
End: 2022-06-22

## 2022-06-23 ENCOUNTER — APPOINTMENT (OUTPATIENT)
Dept: MAMMOGRAPHY | Facility: IMAGING CENTER | Age: 47
End: 2022-06-23
Payer: COMMERCIAL

## 2022-06-23 ENCOUNTER — RESULT REVIEW (OUTPATIENT)
Age: 47
End: 2022-06-23

## 2022-06-23 ENCOUNTER — TRANSCRIPTION ENCOUNTER (OUTPATIENT)
Age: 47
End: 2022-06-23

## 2022-06-23 ENCOUNTER — OUTPATIENT (OUTPATIENT)
Dept: OUTPATIENT SERVICES | Facility: HOSPITAL | Age: 47
LOS: 1 days | End: 2022-06-23
Payer: COMMERCIAL

## 2022-06-23 ENCOUNTER — OUTPATIENT (OUTPATIENT)
Dept: OUTPATIENT SERVICES | Facility: HOSPITAL | Age: 47
LOS: 1 days | Discharge: HOME | End: 2022-06-23

## 2022-06-23 VITALS
TEMPERATURE: 97 F | DIASTOLIC BLOOD PRESSURE: 72 MMHG | RESPIRATION RATE: 20 BRPM | SYSTOLIC BLOOD PRESSURE: 121 MMHG | OXYGEN SATURATION: 100 % | HEART RATE: 94 BPM

## 2022-06-23 VITALS
RESPIRATION RATE: 16 BRPM | OXYGEN SATURATION: 100 % | TEMPERATURE: 97 F | DIASTOLIC BLOOD PRESSURE: 76 MMHG | HEIGHT: 64 IN | HEART RATE: 83 BPM | WEIGHT: 192.02 LBS | SYSTOLIC BLOOD PRESSURE: 111 MMHG

## 2022-06-23 DIAGNOSIS — Z98.890 OTHER SPECIFIED POSTPROCEDURAL STATES: Chronic | ICD-10-CM

## 2022-06-23 DIAGNOSIS — Z00.8 ENCOUNTER FOR OTHER GENERAL EXAMINATION: ICD-10-CM

## 2022-06-23 DIAGNOSIS — Z98.891 HISTORY OF UTERINE SCAR FROM PREVIOUS SURGERY: Chronic | ICD-10-CM

## 2022-06-23 DIAGNOSIS — D48.61 NEOPLASM OF UNCERTAIN BEHAVIOR OF RIGHT BREAST: ICD-10-CM

## 2022-06-23 PROCEDURE — 88307 TISSUE EXAM BY PATHOLOGIST: CPT | Mod: 26

## 2022-06-23 PROCEDURE — 76098 X-RAY EXAM SURGICAL SPECIMEN: CPT

## 2022-06-23 PROCEDURE — 76098 X-RAY EXAM SURGICAL SPECIMEN: CPT | Mod: 26

## 2022-06-23 NOTE — BRIEF OPERATIVE NOTE - OPERATION/FINDINGS
Right breast excisional biopsy post RUTH  with confirmation of RUTH and clip on follow up mammography. Hemostasis with cautery, dry at end of case.

## 2022-06-23 NOTE — ASU DISCHARGE PLAN (ADULT/PEDIATRIC) - ASU DC SPECIAL INSTRUCTIONSFT
You had a right breast excisional biopsy. The incision is closed with Small white bandages and an outer dressing of gauze and tape. Remove the outer dressing tomorrow morning. Please do not remove the small white bandages, they will fall off on their own in 7-10 days. You may shower after the outer dressing comes off tomorrow, pat the area with the white bandages dry gently. You may resume a usual diet and all medications you usually take. For pain you may take tylenol every 6 hours as needed for pain, do not exceed 4000mg tylenol in a 24 hour period, or any other medications as prescribed by Dr. Vidal. You may resume activity as tolerated. No heavy lifting (>10 pounds)  for 6 weeks after surgery. Please call the office to follow up with your surgeon. Please call the office with any questions. You should schedule an appointment for Wednesday, July 6 to follow up with Dr. Vidal.

## 2022-06-23 NOTE — ASU DISCHARGE PLAN (ADULT/PEDIATRIC) - CALL YOUR DOCTOR IF YOU HAVE ANY OF THE FOLLOWING:
Bleeding that does not stop/Swelling that gets worse/Fever greater than (need to indicate Fahrenheit or Celsius)/Wound/Surgical Site with redness, or foul smelling discharge or pus/Nausea and vomiting that does not stop/Inability to tolerate liquids or foods

## 2022-06-23 NOTE — ASU DISCHARGE PLAN (ADULT/PEDIATRIC) - NS MD DC FALL RISK RISK
For information on Fall & Injury Prevention, visit: [email or web address] OR  [email or web address] OR  https://www.cdc.gov/steadi/patient.html

## 2022-06-28 LAB — SURGICAL PATHOLOGY STUDY: SIGNIFICANT CHANGE UP

## 2023-02-07 ENCOUNTER — APPOINTMENT (OUTPATIENT)
Dept: ENDOCRINOLOGY | Facility: CLINIC | Age: 48
End: 2023-02-07

## 2023-04-17 NOTE — DISCHARGE NOTE OB - BREAST MILK SUPPORTS STABLE NEWBORN BLOOD SUGAR
Patient was just in the hospital and when he was there they had him stop taking his metroprolol medication if his heart rate was under 50.says his heart rate is usually in the higher 40s, now patient is out of the hospital and wants to know if he should continue medication again or what he should do. Verified patients phone number.    Statement Selected

## 2023-05-20 ENCOUNTER — NON-APPOINTMENT (OUTPATIENT)
Age: 48
End: 2023-05-20

## 2023-05-22 ENCOUNTER — NON-APPOINTMENT (OUTPATIENT)
Age: 48
End: 2023-05-22

## 2023-08-20 NOTE — OB RN TRIAGE NOTE - GRAVIDA, OB PROFILE
[facility] Progress Note           Nancy Erickson Patient Status:  Inpatient    1947 MRN [id number]   Location [address] TELEMETRY Attending Jeevan Rosario MD   Hosp Day # 3 PCP Angel Rosario MD     Subjective:      Patient doing well  No chest pain or shortness of breath  Off O2  No cough or hemoptysis  No palpitations  Denies nausea and vomiting  Afebrile    Left lower extremity swelling is down some English superficial veins are noted skin intact    Under aseptic conditions right groin and left popliteal sutures were removed.  There is no hematoma or bleeding.  Dressing with Tegaderm applied        Telemetry personally reviewed: Sinus rhythm no ectopy      Review of Systems    As above in the subjective. All other systems reviewed and are negative except as listed.      Objective:     Medications:  Current Facility-Administered Medications   Medication Dose Route Frequency Provider Last Rate Last Admin   • docusate sodium-sennosides (SENOKOT S) 50-8.6 MG 1 tablet  1 tablet Oral Nightly Jeevan Rosario MD   1 tablet at 23   • polyethylene glycol (MIRALAX) packet 17 g  17 g Oral Daily Jeevan Rosario MD   17 g at 23 1333   • apixaBAN (ELIQUIS) tablet 10 mg  10 mg Oral 2 times per day Charlotte Alonzo MD   10 mg at 23      Current Facility-Administered Medications   Medication Dose Route Frequency Provider Last Rate Last Admin   • sodium chloride 0.9% infusion   Intravenous Continuous PRN Charlotte Alonzo MD       • sodium chloride 0.9% infusion   Intravenous Continuous PRN Charlotte Alonzo MD          Current Facility-Administered Medications   Medication Dose Route Frequency Provider Last Rate Last Admin   • acetaminophen (TYLENOL) tablet 500 mg  500 mg Oral Q6H PRN Corina Clark DO   500 mg at 239   • sodium chloride 0.9% infusion   Intravenous Continuous PRN Charlotte Alonzo MD       • sodium chloride 0.9% infusion   Intravenous  Continuous PRN Charlotte Alonzo MD            Allergies:   ALLERGIES:   Allergen Reactions   • Meloxicam SHORTNESS OF BREATH        Physical Exam:  Vital Last Value 24 Hour Range   Temperature 98.4 °F (36.9 °C) (08/19/23 2001) Temp  Min: 97.7 °F (36.5 °C)  Max: 98.4 °F (36.9 °C)   Pulse 86 (08/19/23 2001) Pulse  Min: 76  Max: 94   Respiratory 18 (08/19/23 2001) Resp  Min: 14  Max: 18   Non-Invasive  Blood Pressure 117/74 (08/19/23 2001) BP  Min: 99/58  Max: 134/75   Pulse Oximetry 94 % (08/19/23 2001) SpO2  Min: 92 %  Max: 95 %   Arterial   Blood Pressure   No data recorded         Intake/Output:   No intake or output data in the 24 hours ending 08/19/23 2135    Weight    08/17/23 0115 08/18/23 0400 08/19/23 0404   Weight: 87.4 kg (192 lb 10.9 oz) 87.2 kg (192 lb 3.9 oz) 90 kg (198 lb 6.6 oz)        Patient is comfortable    Alert and pleasant and cooperative in good spirits    Skin is warm and dry    Neck veins are not distended    Lungs are clear to auscultation    Heart is regular S1-S2 with no gallop no rub    The abdomen is soft nontender bowel sounds are present    Extremities reveal no edema    Neuro: Alert and oriented x3, speech fluent, cranial nerves intact, no focal deficit involving the extremities       Clinical Data:   (PERSONALLY REVIEWED)    Labs    CBC  Recent Labs   Lab 08/19/23  0433 08/18/23  0421 08/17/23  0342   WBC 31.4* 36.0* 40.0*   HCT 29.6* 33.5* 35.1*   HGB 9.0* 10.4* 11.2*    250 231       CMP  Recent Labs   Lab 08/19/23  0433 08/18/23  0421 08/17/23  0342   SODIUM 142 143 140   POTASSIUM 4.0 4.5 4.1   CHLORIDE 109 109 106   CO2 26 26 24   GLUCOSE 101* 111* 112*   BUN 24* 22* 22*   CREATININE 1.39* 1.32* 1.40*   CALCIUM 8.0* 7.9* 8.2*   TOTPROTEIN 5.7* 6.0* 6.5   ALBUMIN 2.6* 2.6* 3.0*   BILIRUBIN 0.9 1.0 1.1*   AST 14 14 10   GPT 12 12 13   ALKPT 59 71 74       Cardiac Labs  Recent Labs   Lab 08/16/23  1939   HTROPI 5   NTPROB 84       Lipid Panel  No results  found    Coags  Recent Labs   Lab 23  1013 23  0421 23  0835 23  0647 23  1939   INR  --   --   --   --  1.0   PTT 75* 64* 117*   < > 26    < > = values in this interval not displayed.       ABG  No results found    Imaging    ECG:   Encounter Date: 23   Electrocardiogram 12-Lead   Result Value    Ventricular Rate EKG/Min (BPM) 98    Atrial Rate (BPM) 98    UT-Interval (MSEC) 136    QRS-Interval (MSEC) 80    QT-Interval (MSEC) 340    QTc 434    P Axis (Degrees) 58    R Axis (Degrees) -6    T Axis (Degrees) 38    REPORT TEXT      Normal sinus rhythm  Possible  Anterior infarct  , age undetermined  Abnormal ECG  No previous ECGs available  Confirmed by JAN JOHNSON MD () on 2023 7:27:39 AM          Echocardiogram:  Results for orders placed during the hospital encounter of 23    TRANSTHORACIC ECHO (TTE) COMPLETE W/ W/O IMAGING AGENT    Narrative  *[facility]*  [address] [place] [address]  Transthoracic Echocardiogram (TTE)    Patient: Nancy Erickson     Study Date/Time:     Aug 17 2023 11:06AM  MRN:     [id number]                FIN#:                [id number]  :     1947             Ht/Wt:               167cm 87kg  Age:     75                     BSA/BMI:             2.04m^2 31.2kg/m^2  Gender:  F                      Baseline BP:         156 / 78  Ordering Physician:   Liam Leslie    Referring Physician:  Liam Leslie Neilesh K    Attending Physician:  Karlo Magana  Performing Physician: [facility]  Diagnostic Physician: Liam Soto MD  Sonographer:          RAFFI Richardson    ------------------------------------------------------------------------------  INDICATIONS:   Right heart strain on CTA with PE.    ------------------------------------------------------------------------------  STUDY CONCLUSIONS  SUMMARY:    1. Left ventricle: The cavity size is normal. Wall thickness is  normal.  Systolic function is normal. The ejection fraction was measured by biplane  method of disks. Wall motion is normal; there are no regional wall motion  abnormalities. Doppler parameters are consistent with abnormal left  ventricular relaxation (grade 1 diastolic dysfunction). The ejection  fraction is 66%.  2. Mitral valve: There is mild regurgitation.  3. Right ventricle: The cavity size is normal. Wall thickness is normal.  Systolic function is normal. The estimated peak pressure is 32mm Hg.    ------------------------------------------------------------------------------  STUDY DATA:   Procedure:  A transthoracic echocardiogram was performed. Image  quality was good.  M-mode, complete 2D, complete spectral Doppler, and color  Doppler.  Study status:  STAT.  Study completion:  There were no  complications.    FINDINGS    LEFT VENTRICLE:  The cavity size is normal. Wall thickness is normal. There is  no evidence of hypertrophy. Systolic function is normal. Wall motion is  normal; there are no regional wall motion abnormalities.    The ejection  fraction was measured by biplane method of disks. The ejection fraction is  66%. The tissue Doppler parameters are abnormal. Doppler parameters are  consistent with abnormal left ventricular relaxation (grade 1 diastolic  dysfunction).    AORTIC VALVE:  The annulus is normal. The valve is structurally normal. The  valve is trileaflet. The leaflets are normal thickness. Cusp separation is  normal. Mobility is not restricted. Velocity is within the normal range. There  is no stenosis. There is no significant regurgitation.    AORTA:  Aorta: The aorta is poorly visualized.    MITRAL VALVE:  The annulus is mildly calcified. The leaflets are normal  thickness. Leaflet separation is normal. Mobility is not restricted. Inflow  velocity is within the normal range. There is no evidence for stenosis. There  is mild regurgitation.    LEFT ATRIUM:  The atrium is normal in  size.    RIGHT VENTRICLE:  The cavity size is normal. Wall thickness is normal.  Systolic function is normal. The estimated peak pressure is 32mm Hg.       The  RV pressure during systole is 8mm Hg.    PULMONIC VALVE:   Not well visualized. There is no significant regurgitation.    TRICUSPID VALVE:  The valve is structurally normal. Leaflet separation is  normal. Inflow velocity is within the normal range. There is no evidence for  stenosis. There is trivial regurgitation.    RIGHT ATRIUM:  The atrium is normal in size.    PERICARDIUM:  The pericardium is normal in appearance.  There is no  pericardial effusion.    SYSTEMIC VEINS:  Inferior vena cava:  Respirophasic diameter changes are in the normal range  (>= 50%).    ------------------------------------------------------------------------------  Measurements    Left ventricle         Value        Ref        Left atrium continued     Value           Ref  KRYSTYNA, LAX chord     (N) 4.7   cm     3.8 - 5.2  AP dim index, ES      (N) 1.8    cm/m^2   1.5 - 2.3  ESD, LAX chord     (N) 2.8   cm     2.2 - 3.5  Area ES, A4C          (N) 18     cm^2     <=20  KRYSTYNA/bsa, LAX chord (N) 2.3   cm/m^2 2.3 - 3.1  Area/bsa ES, A4C          8.97   cm^2/m^2 ---------  ESD/bsa, LAX chord (N) 1.4   cm/m^2 1.3 - 2.1  Vol, S                (H) 65     ml       22 - 52  PW, ED, LAX        (N) 0.9   cm     0.6 - 0.9  Vol/bsa, S            (N) 32     ml/m^2   16 - 34  IVS, ED            (N) 0.9   cm     0.6 - 0.9  Vol, ES, 1-p A4C      (H) 53     ml       22 - 52  PW, ED             (N) 0.9   cm     0.6 - 0.9  Vol/bsa, ES, 1-p A4C  (N) 26     ml/m^2   11 - 40  IVS/PW, ED             1.03         ---------  Vol, ES, 1-p A2C      (H) 63     ml       22 - 52  EDV                (N) 103   ml     46 - 106   Vol/bsa, ES, 1-p A2C  (N) 31     ml/m^2   13 - 40  ESV                (N) 31    ml     14 - 42  EF                 (N) 66    %      54 - 74    Mitral valve              Value           Ref  SV                      72    ml     ---------  Peak E                    0.68   m/sec    ---------  EDV/bsa            (N) 50    ml/m^2 29 - 61    Peak A                    1.07   m/sec    ---------  ESV/bsa            (N) 15    ml/m^2 8 - 24     Decel slope               591.24 cm/s^2   ---------  SV/bsa                 35    ml/m^2 ---------  Decel time                115    ms       ---------  ESV, 1-p A4C       (N) 25    ml     12 - 60    Peak E/A ratio            0.64            ---------  SV, 1-p A4C            63    ml     ---------  MR peak v                 5.08   m/sec    ---------  ESV/bsa, 1-p A4C   (N) 12    ml/m^2 7 - 35     Peak LV-LA grad S         103    mm Hg    ---------  SV/bsa, 1-p A4C        31    ml/m^2 ---------  EDV, 2-p           (N) 75    ml     46 - 106   Tricuspid valve           Value           Ref  ESV, 2-p           (N) 25    ml     14 - 42    TR peak v             (N) 1.7    m/sec    <=2.8  EDV/bsa, 2-p       (N) 37    ml/m^2 29 - 61    Peak RV-RA grad, S        11     mm Hg    ---------  ESV/bsa, 2-p       (N) 12    ml/m^2 8 - 24  Aortic root               Value           Ref  Right ventricle        Value        Ref        Root diam             (N) 2.9    cm       2.7 - 4.2  KRYSTYNA, LAX               3.2   cm     ---------  Root diam/bsa             1.4    cm/m^2   ---------  TAPSE, MM          (N) 2.5   cm     >=1.7      Root diam, ED         (N) 2.9    cm       2.7 - 4.2  Pressure, S            8     mm Hg  ---------  Pulmonary artery          Value           Ref  Left atrium            Value        Ref        Pressure, S               8      mm Hg    ---------  AP dim, ES         (N) 3.6   cm     2.7 - 3.8  Legend:  (L)  and  (H)  regis values outside specified reference range.    (N)  marks values inside specified reference range.    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  Ultrasound-guided left popliteal vein  2.  Left popliteal vein angiogram  3.  Inferior venacavogram  4.  Aspiration thrombectomy with penumbra lightening flash 16  5.  Intravascular ultrasound IVUS  6.  Balloon angioplasty to the left femoral and popliteal vein, iliac,   common femoral  7.  Moderate sedation    Findings:  1.  Extensive left lower extremity DVT status post successful aspiration   thrombectomy  2.  Balloon angioplasty to left iliac and common femoral severe stenosis  3.  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  Bedrest for 2 hours  2.  Leg elevation overnight  3.  Start Eliquis 10 mg twice daily  4.  Staged left venous IVUS and possible stenting in 1 to 2 weeks  5.  Compression stockings for 2 years 20-30, to be changed every 6 months   to prevent post thrombotic syndrome  6.  Remove the suture tomorrow from the left groin and popliteal and   remove the compression  7.  Follow-up with me in the office in 2 to 4 weeks         Assessment and Plan:      Acute submassive bilateral saddle pulmonary embolism  Acute extensive DVT of LLE  Acute hypoxemic respiratory failure on 2 L of oxygen  Dyspnea  -D-dimer 16.56  - Acute unprovoked, possibly secondary to malignancy CLL  - On 2L O2  - Trop and BNP normal  - RV strain by CT and high clot burden suggestive of submassive PE with moderate risk   - US LE shows extensive occlusive DVT in LLE beginning in posterior tibial and peroneal veins and extends proximally through the popliteal and femoral veins into the common femoral vein and distal external iliac. Proximal extent of thrombus unidentified.  - CT chest personally reviewed and showed large saddle  pulmonary embolus in main PA and right left branches. Large clot burden predominantly in the lower lob and milder in upper lobes. Mild RV dilation.  Status post pulmonary aspiration thrombectomy 8/17/2023  Status post left lower extremity and iliac thrombectomy/PTA 8/18/2023    Patient will need repeat imaging of the left iliac vein to rule out May Thurner syndrome in several weeks plus minus left iliac vein stenting then    Will likely need indefinite anticoagulation    Left lower extremity compression discussed    Follow-up echo in 2 to 3 months to evaluate RV and PAP    Eliquis started  Hypertension   -Resolved on no meds     CKD 3  - Nephrology following stable postprocedure     Chronic lymphocytic leukemia  - Managed per Oncology     Discussed with RN Juanita Noel to discharge from cardiac perspective    Follow-up with Dr Alonzo in 3 weeks    [facility] Kyle Freed MD,FAC  [facility]

## 2023-11-08 NOTE — OB RN PATIENT PROFILE - NS PRO RUBELLA RECEIVED Y/N
Alert-The patient is alert, awake and responds to voice. The patient is oriented to time, place, and person. The triage nurse is able to obtain subjective information. no...

## 2023-11-24 NOTE — OB RN TRIAGE NOTE - NSLDARRIVAL_OBGYN_ALL_OB_START_DATE
PAST SURGICAL HISTORY:  H/O  section     H/O neck surgery partial right mandibulectomy with fibular free flap reconstriction and right neck lymphnode dissection 22    History of hip replacement left hip    History of partial hysterectomy     History of vascular access device     
19-May-2019 15:59

## 2023-12-17 ENCOUNTER — NON-APPOINTMENT (OUTPATIENT)
Age: 48
End: 2023-12-17

## 2023-12-19 ENCOUNTER — NON-APPOINTMENT (OUTPATIENT)
Age: 48
End: 2023-12-19

## 2024-04-19 ENCOUNTER — OUTPATIENT (OUTPATIENT)
Dept: OUTPATIENT SERVICES | Facility: HOSPITAL | Age: 49
LOS: 1 days | End: 2024-04-19

## 2024-04-19 VITALS
TEMPERATURE: 98 F | HEIGHT: 63 IN | HEART RATE: 97 BPM | RESPIRATION RATE: 16 BRPM | WEIGHT: 212.97 LBS | DIASTOLIC BLOOD PRESSURE: 91 MMHG | SYSTOLIC BLOOD PRESSURE: 123 MMHG | OXYGEN SATURATION: 99 %

## 2024-04-19 DIAGNOSIS — N84.0 POLYP OF CORPUS UTERI: ICD-10-CM

## 2024-04-19 DIAGNOSIS — N93.9 ABNORMAL UTERINE AND VAGINAL BLEEDING, UNSPECIFIED: ICD-10-CM

## 2024-04-19 DIAGNOSIS — Z98.890 OTHER SPECIFIED POSTPROCEDURAL STATES: Chronic | ICD-10-CM

## 2024-04-19 DIAGNOSIS — Z98.891 HISTORY OF UTERINE SCAR FROM PREVIOUS SURGERY: Chronic | ICD-10-CM

## 2024-04-19 LAB
HCG SERPL-ACNC: <1 MIU/ML — SIGNIFICANT CHANGE UP
HCT VFR BLD CALC: 41.4 % — SIGNIFICANT CHANGE UP (ref 34.5–45)
HGB BLD-MCNC: 13.1 G/DL — SIGNIFICANT CHANGE UP (ref 11.5–15.5)
MCHC RBC-ENTMCNC: 27.3 PG — SIGNIFICANT CHANGE UP (ref 27–34)
MCHC RBC-ENTMCNC: 31.6 GM/DL — LOW (ref 32–36)
MCV RBC AUTO: 86.3 FL — SIGNIFICANT CHANGE UP (ref 80–100)
NRBC # BLD: 0 /100 WBCS — SIGNIFICANT CHANGE UP (ref 0–0)
NRBC # FLD: 0 K/UL — SIGNIFICANT CHANGE UP (ref 0–0)
PLATELET # BLD AUTO: 323 K/UL — SIGNIFICANT CHANGE UP (ref 150–400)
RBC # BLD: 4.8 M/UL — SIGNIFICANT CHANGE UP (ref 3.8–5.2)
RBC # FLD: 13.7 % — SIGNIFICANT CHANGE UP (ref 10.3–14.5)
WBC # BLD: 12.36 K/UL — HIGH (ref 3.8–10.5)
WBC # FLD AUTO: 12.36 K/UL — HIGH (ref 3.8–10.5)

## 2024-04-19 RX ORDER — DROSPIRENONE AND ETHINYL ESTRADIOL 0.03MG-3MG
1 KIT ORAL
Qty: 0 | Refills: 0 | DISCHARGE

## 2024-04-19 RX ORDER — SODIUM CHLORIDE 9 MG/ML
3 INJECTION INTRAMUSCULAR; INTRAVENOUS; SUBCUTANEOUS EVERY 8 HOURS
Refills: 0 | Status: DISCONTINUED | OUTPATIENT
Start: 2024-04-24 | End: 2024-05-08

## 2024-04-19 RX ORDER — SODIUM CHLORIDE 9 MG/ML
1000 INJECTION, SOLUTION INTRAVENOUS
Refills: 0 | Status: DISCONTINUED | OUTPATIENT
Start: 2024-04-24 | End: 2024-05-08

## 2024-04-19 NOTE — H&P PST ADULT - PROBLEM SELECTOR PLAN 1
Pre-op instructions provided. Pt verbalized understanding.   Pepcid provided for GI prophylaxis.   Pt given urine specimen cup for ucg on admission.   CBC, HCG done at PST.

## 2024-04-19 NOTE — H&P PST ADULT - NSCAFFEINETYPE_GEN_ALL_CORE_SD
VACCINE ADMINISTRATION RECORD  PARENT / GUARDIAN APPROVAL  Date: 2018  Vaccine administered to: Mari Masterson     : 2012    MRN: [id number]    A copy of the appropriate Centers for Disease Control and Prevention Vaccine Information statemen coffee

## 2024-04-19 NOTE — H&P PST ADULT - NSICDXPASTMEDICALHX_GEN_ALL_CORE_FT
PAST MEDICAL HISTORY:  Childhood asthma     Papilloma of right breast     Spontaneous  x2     PAST MEDICAL HISTORY:  Childhood asthma     Obese     Papilloma of right breast     Spontaneous  x2

## 2024-04-19 NOTE — H&P PST ADULT - HISTORY OF PRESENT ILLNESS
48 year old female with hx of uterine fibroids with  c/o irregular and heavy periods for past 6 months. Pt presents today for presurgical evaluation for ... 48 year old female with hx of uterine fibroids with  c/o irregular and heavy periods for past 6 months. Pt presents today for presurgical evaluation for Hysteroscopy Dilation Curettage Myosure, Hydro Thermal Ablation.

## 2024-04-23 ENCOUNTER — TRANSCRIPTION ENCOUNTER (OUTPATIENT)
Age: 49
End: 2024-04-23

## 2024-04-23 NOTE — ASU PATIENT PROFILE, ADULT - NSICDXPASTMEDICALHX_GEN_ALL_CORE_FT
PAST MEDICAL HISTORY:  Childhood asthma     Obese     Papilloma of right breast     Spontaneous  x2

## 2024-04-23 NOTE — ASU PATIENT PROFILE, ADULT - ARRIVAL TIME
08:00 Otolaryngologist Procedure Text (A): After obtaining clear surgical margins the patient was sent to otolaryngology for surgical repair.  The patient understands they will receive post-surgical care and follow-up from the referring physician's office.

## 2024-04-24 ENCOUNTER — TRANSCRIPTION ENCOUNTER (OUTPATIENT)
Age: 49
End: 2024-04-24

## 2024-04-24 ENCOUNTER — OUTPATIENT (OUTPATIENT)
Dept: OUTPATIENT SERVICES | Facility: HOSPITAL | Age: 49
LOS: 1 days | Discharge: HOME | End: 2024-04-24
Payer: COMMERCIAL

## 2024-04-24 VITALS
WEIGHT: 212.97 LBS | OXYGEN SATURATION: 96 % | HEIGHT: 63 IN | SYSTOLIC BLOOD PRESSURE: 113 MMHG | HEART RATE: 94 BPM | DIASTOLIC BLOOD PRESSURE: 91 MMHG | TEMPERATURE: 98 F | RESPIRATION RATE: 15 BRPM

## 2024-04-24 VITALS
SYSTOLIC BLOOD PRESSURE: 123 MMHG | HEART RATE: 70 BPM | RESPIRATION RATE: 16 BRPM | DIASTOLIC BLOOD PRESSURE: 76 MMHG | OXYGEN SATURATION: 99 %

## 2024-04-24 DIAGNOSIS — N84.0 POLYP OF CORPUS UTERI: ICD-10-CM

## 2024-04-24 DIAGNOSIS — Z98.890 OTHER SPECIFIED POSTPROCEDURAL STATES: Chronic | ICD-10-CM

## 2024-04-24 DIAGNOSIS — Z98.891 HISTORY OF UTERINE SCAR FROM PREVIOUS SURGERY: Chronic | ICD-10-CM

## 2024-04-24 LAB — HCG UR QL: NEGATIVE — SIGNIFICANT CHANGE UP

## 2024-04-24 PROCEDURE — 88305 TISSUE EXAM BY PATHOLOGIST: CPT | Mod: 26

## 2024-04-24 DEVICE — MYOSURE TISSUE REMOVAL DEVICE REACH
Type: IMPLANTABLE DEVICE | Status: NON-FUNCTIONAL
Removed: 2024-04-24

## 2024-04-24 RX ORDER — SODIUM CHLORIDE 9 MG/ML
1000 INJECTION, SOLUTION INTRAVENOUS
Refills: 0 | Status: DISCONTINUED | OUTPATIENT
Start: 2024-04-24 | End: 2024-05-08

## 2024-04-24 RX ORDER — FEXOFENADINE HCL 30 MG
1 TABLET ORAL
Qty: 0 | Refills: 0 | DISCHARGE

## 2024-04-24 NOTE — ASU DISCHARGE PLAN (ADULT/PEDIATRIC) - NURSING INSTRUCTIONS
You were given intravenous TYLENOL for pain management at  10.30 am. Please DO NOT take any products containing TYLENOL or ACETAMINOPHEN, such as VICODIN, PERCOCET, EXCEDRIN, and any over-the-counter cold medication for the next 6 hours (until  4.30 pm DO NOT TAKE MORE THAN 3000 MG OF TYLENOL in a 24 hour period. You were given intravenous TORADOL for pain management at  10.30 am Please DO NOT take ibuprofen containing products such as MOTRIN or ADVIL, or any other NSAIDs (Non-Steroidal Anti-Inflammatory Drugs) such as ALEVE for the next 6 hours (until  4.30 [phone]

## 2024-04-24 NOTE — ASU DISCHARGE PLAN (ADULT/PEDIATRIC) - DRIVING
No... Rituxan Pregnancy And Lactation Text: This medication is Pregnancy Category C and it isn't know if it is safe during pregnancy. It is unknown if this medication is excreted in breast milk but similar antibodies are known to be excreted.

## 2024-04-24 NOTE — ASU DISCHARGE PLAN (ADULT/PEDIATRIC) - NS MD DC FALL RISK RISK
For information on Fall & Injury Prevention, visit: [email or web address] OR  [email or web address] OR  https://www.cdc.gov/steadi/patient.html

## 2024-04-24 NOTE — ASU DISCHARGE PLAN (ADULT/PEDIATRIC) - CARE PROVIDER_API CALL
Paris Khalil  Obstetrics and Gynecology  [address], [address]  [place] [address]  Phone: [phone]  Fax: [phone]  Follow Up Time: 2 weeks

## 2024-05-01 LAB — SURGICAL PATHOLOGY STUDY: SIGNIFICANT CHANGE UP

## 2024-05-30 NOTE — ASU DISCHARGE PLAN (ADULT/PEDIATRIC) - CARE PROVIDER_API CALL
----- Message from Barry Smith PA-C sent at 5/30/2024  9:18 AM CDT -----  Please see if we can move her CT scan up to either today or tomorrow, we may need to do it without contrast because her kidney function is poor.  I would also like her to collect a urinalysis today.  She has some significant anemia and evidence of an infection.  I want to search for the source of infection through the CT scan and a urinalysis.  She also needs to do a Hemoccult of the stool.  I will place the orders for the Hemoccult analysis and please check with the CT scan check tech to see if we can either reduce the dose of contrast or we may need to do the CT scan without contrast.   Betzaida Vidal)  Surgery  [address], [address]  [place] [address]  Phone: [phone]  Fax: [phone]  Follow Up Time:

## 2024-09-11 ENCOUNTER — NON-APPOINTMENT (OUTPATIENT)
Age: 49
End: 2024-09-11

## 2025-05-16 NOTE — OB PROVIDER TRIAGE NOTE - PSH
History of dilation and curettage  D&E for chromosome deletion  Missed   D and C  x2  Status post right breast lumpectomy  2018 none

## (undated) DEVICE — PREP BETADINE KIT

## (undated) DEVICE — SUT CHROMIC 3-0 27" SH

## (undated) DEVICE — DRSG TELFA 3 X 8

## (undated) DEVICE — SUT MONOCRYL 4-0 27" PS-2 UNDYED

## (undated) DEVICE — POSITIONER STRAP ARMBOARD VELCRO TS-30

## (undated) DEVICE — SOL IRR POUR NS 0.9% 1000ML

## (undated) DEVICE — LABELS BLANK W PEN

## (undated) DEVICE — SOL IRR BAG NS 0.9% 3000ML

## (undated) DEVICE — GLV 8 PROTEXIS (WHITE)

## (undated) DEVICE — WARMING BLANKET UPPER ADULT

## (undated) DEVICE — DRSG TEGADERM 4X4.75"

## (undated) DEVICE — RADIOGRAPHY DVC SPEC TRANSPEC

## (undated) DEVICE — DRAPE CHEST BREAST 106" X 122"

## (undated) DEVICE — PREP BETADINE SPONGE STICKS

## (undated) DEVICE — WARMING BLANKET LOWER ADULT

## (undated) DEVICE — ELCTR PLASMA BLADE X 3.0S LIGHT

## (undated) DEVICE — SUT CHROMIC 3-0 54" TIES

## (undated) DEVICE — DRSG BENZOIN 0.6CC

## (undated) DEVICE — DRSG STERISTRIPS 0.5 X 4"

## (undated) DEVICE — SOL IRR POUR NS 0.9% 500ML

## (undated) DEVICE — GOWN XL

## (undated) DEVICE — VENODYNE/SCD SLEEVE CALF MEDIUM

## (undated) DEVICE — TUBING IRR SET FOR CYSTOSCOPY 77"

## (undated) DEVICE — TUBING SUCTION NONCONDUCTIVE 6MM X 12FT

## (undated) DEVICE — ELCTR GROUNDING PAD ADULT COVIDIEN

## (undated) DEVICE — GLV 7 PROTEXIS (CREAM) MICRO

## (undated) DEVICE — GOWN LG

## (undated) DEVICE — DRAPE TOWEL BLUE 17" X 24"

## (undated) DEVICE — PROTECTOR HEEL / ELBOW FLUFFY

## (undated) DEVICE — BASIN SET DOUBLE

## (undated) DEVICE — SAVI SCOUT HANDPIECE

## (undated) DEVICE — PRESSURE INFUSOR BAG 1000ML

## (undated) DEVICE — FLUENT FMS PROCEDURE KIT

## (undated) DEVICE — GLV 7 PROTEXIS (WHITE)

## (undated) DEVICE — HTA PROCEDURE SET

## (undated) DEVICE — ELCTR BOVIE TIP BLADE INSULATED 2.75" EDGE

## (undated) DEVICE — SOL IRR BAG NS 0.9% 1000ML

## (undated) DEVICE — DRAPE LIGHT HANDLE COVER (GREEN)

## (undated) DEVICE — DRSG CURITY GAUZE SPONGE 4 X 4" 12-PLY

## (undated) DEVICE — PACK D&C

## (undated) DEVICE — VISITEC 4X4

## (undated) DEVICE — GLV 6.5 PROTEXIS (WHITE)

## (undated) DEVICE — SOL INJ NS 0.9% 1000ML

## (undated) DEVICE — DRAPE IRRIGATION POUCH 19X23"